# Patient Record
Sex: MALE | Race: WHITE | NOT HISPANIC OR LATINO | Employment: OTHER | URBAN - METROPOLITAN AREA
[De-identification: names, ages, dates, MRNs, and addresses within clinical notes are randomized per-mention and may not be internally consistent; named-entity substitution may affect disease eponyms.]

---

## 2014-04-30 LAB — CRC RECOMMENDATION EXT: NORMAL

## 2017-06-02 LAB — CRC RECOMMENDATION EXT: NORMAL

## 2017-06-29 ENCOUNTER — HISTORICAL (OUTPATIENT)
Dept: ADMINISTRATIVE | Facility: HOSPITAL | Age: 52
End: 2017-06-29

## 2017-06-29 LAB
ABS NEUT (OLG): 4.13 X10(3)/MCL (ref 2.1–9.2)
ALBUMIN SERPL-MCNC: 4.1 GM/DL (ref 3.4–5)
ALBUMIN/GLOB SERPL: 1.5 {RATIO}
ALP SERPL-CCNC: 47 UNIT/L (ref 50–136)
ALT SERPL-CCNC: 43 UNIT/L (ref 12–78)
APPEARANCE, UA: CLEAR
AST SERPL-CCNC: 25 UNIT/L (ref 15–37)
BACTERIA SPEC CULT: ABNORMAL /HPF
BASOPHILS # BLD AUTO: 0.1 X10(3)/MCL (ref 0–0.2)
BASOPHILS NFR BLD AUTO: 1 %
BILIRUB SERPL-MCNC: 0.5 MG/DL (ref 0.2–1)
BILIRUB UR QL STRIP: ABNORMAL
BILIRUBIN DIRECT+TOT PNL SERPL-MCNC: 0.1 MG/DL (ref 0–0.2)
BILIRUBIN DIRECT+TOT PNL SERPL-MCNC: 0.4 MG/DL (ref 0–0.8)
BUN SERPL-MCNC: 20 MG/DL (ref 7–18)
CALCIUM SERPL-MCNC: 8.8 MG/DL (ref 8.5–10.1)
CHLORIDE SERPL-SCNC: 102 MMOL/L (ref 98–107)
CHOLEST SERPL-MCNC: 91 MG/DL (ref 0–200)
CHOLEST/HDLC SERPL: 2.8 {RATIO} (ref 0–5)
CO2 SERPL-SCNC: 25 MMOL/L (ref 21–32)
COLOR UR: YELLOW
CREAT SERPL-MCNC: 1.23 MG/DL (ref 0.7–1.3)
EOSINOPHIL # BLD AUTO: 0.2 X10(3)/MCL (ref 0–0.9)
EOSINOPHIL NFR BLD AUTO: 3 %
ERYTHROCYTE [DISTWIDTH] IN BLOOD BY AUTOMATED COUNT: 13.4 % (ref 11.5–17)
EST. AVERAGE GLUCOSE BLD GHB EST-MCNC: 137 MG/DL
GLOBULIN SER-MCNC: 2.8 GM/DL (ref 2.4–3.5)
GLUCOSE (UA): NEGATIVE
GLUCOSE SERPL-MCNC: 133 MG/DL (ref 74–106)
HBA1C MFR BLD: 6.4 % (ref 4.2–6.3)
HCT VFR BLD AUTO: 49.2 % (ref 42–52)
HDLC SERPL-MCNC: 33 MG/DL (ref 35–60)
HGB BLD-MCNC: 16.3 GM/DL (ref 14–18)
HGB UR QL STRIP: NEGATIVE
KETONES UR QL STRIP: ABNORMAL
LDLC SERPL CALC-MCNC: 20 MG/DL (ref 0–129)
LEUKOCYTE ESTERASE UR QL STRIP: NEGATIVE
LYMPHOCYTES # BLD AUTO: 4.1 X10(3)/MCL (ref 0.6–4.6)
LYMPHOCYTES NFR BLD AUTO: 44 %
MCH RBC QN AUTO: 31.8 PG (ref 27–31)
MCHC RBC AUTO-ENTMCNC: 33.1 GM/DL (ref 33–36)
MCV RBC AUTO: 95.9 FL (ref 80–94)
MONOCYTES # BLD AUTO: 0.8 X10(3)/MCL (ref 0.1–1.3)
MONOCYTES NFR BLD AUTO: 8 %
NEUTROPHILS # BLD AUTO: 4.13 X10(3)/MCL (ref 1.4–7.9)
NEUTROPHILS NFR BLD AUTO: 44 %
NITRITE UR QL STRIP: NEGATIVE
PH UR STRIP: 6 [PH] (ref 5–9)
PLATELET # BLD AUTO: 173 X10(3)/MCL (ref 130–400)
PMV BLD AUTO: 11.8 FL (ref 9.4–12.4)
POTASSIUM SERPL-SCNC: 4.2 MMOL/L (ref 3.5–5.1)
PROT SERPL-MCNC: 6.9 GM/DL (ref 6.4–8.2)
PROT UR QL STRIP: NEGATIVE
PSA SERPL-MCNC: 0.57 NG/ML (ref 0–4)
RBC # BLD AUTO: 5.13 X10(6)/MCL (ref 4.7–6.1)
RBC #/AREA URNS HPF: ABNORMAL /[HPF]
SODIUM SERPL-SCNC: 138 MMOL/L (ref 136–145)
SP GR UR STRIP: 1.02 (ref 1–1.03)
SQUAMOUS EPITHELIAL, UA: ABNORMAL
TRIGL SERPL-MCNC: 189 MG/DL (ref 30–150)
TSH SERPL-ACNC: 3.24 MIU/ML (ref 0.36–3.74)
UROBILINOGEN UR STRIP-ACNC: 0.2
VLDLC SERPL CALC-MCNC: 38 MG/DL
WBC # SPEC AUTO: 9.3 X10(3)/MCL (ref 4.5–11.5)
WBC #/AREA URNS HPF: 40 /HPF (ref 0–3)

## 2017-07-01 LAB — FINAL CULTURE: NO GROWTH

## 2018-07-24 ENCOUNTER — HISTORICAL (OUTPATIENT)
Dept: ADMINISTRATIVE | Facility: HOSPITAL | Age: 53
End: 2018-07-24

## 2018-07-24 LAB
ABS NEUT (OLG): 4.27 X10(3)/MCL (ref 2.1–9.2)
ALBUMIN SERPL-MCNC: 4.2 GM/DL (ref 3.4–5)
ALP SERPL-CCNC: 34 UNIT/L (ref 50–136)
ALT SERPL-CCNC: 33 UNIT/L (ref 12–78)
AST SERPL-CCNC: 21 UNIT/L (ref 15–37)
BASOPHILS # BLD AUTO: 0.1 X10(3)/MCL (ref 0–0.2)
BASOPHILS NFR BLD AUTO: 1 %
BILIRUB SERPL-MCNC: 0.5 MG/DL (ref 0.2–1)
BILIRUBIN DIRECT+TOT PNL SERPL-MCNC: 0.1 MG/DL (ref 0–0.5)
BILIRUBIN DIRECT+TOT PNL SERPL-MCNC: 0.4 MG/DL (ref 0–0.8)
BUN SERPL-MCNC: 26 MG/DL (ref 7–18)
CREAT SERPL-MCNC: 1.21 MG/DL (ref 0.7–1.3)
EOSINOPHIL # BLD AUTO: 0.3 X10(3)/MCL (ref 0–0.9)
EOSINOPHIL NFR BLD AUTO: 3 %
ERYTHROCYTE [DISTWIDTH] IN BLOOD BY AUTOMATED COUNT: 13.8 % (ref 11.5–17)
HCT VFR BLD AUTO: 40.7 % (ref 42–52)
HGB BLD-MCNC: 13.3 GM/DL (ref 14–18)
LIVER PROFILE INTERP: ABNORMAL
LYMPHOCYTES # BLD AUTO: 3.5 X10(3)/MCL (ref 0.6–4.6)
LYMPHOCYTES NFR BLD AUTO: 39 %
MCH RBC QN AUTO: 31.5 PG (ref 27–31)
MCHC RBC AUTO-ENTMCNC: 32.7 GM/DL (ref 33–36)
MCV RBC AUTO: 96.4 FL (ref 80–94)
MONOCYTES # BLD AUTO: 0.8 X10(3)/MCL (ref 0.1–1.3)
MONOCYTES NFR BLD AUTO: 9 %
NEUTROPHILS # BLD AUTO: 4.27 X10(3)/MCL (ref 2.1–9.2)
NEUTROPHILS NFR BLD AUTO: 47 %
PLATELET # BLD AUTO: 190 X10(3)/MCL (ref 130–400)
PMV BLD AUTO: 11.1 FL (ref 9.4–12.4)
PROT SERPL-MCNC: 7.3 GM/DL (ref 6.4–8.2)
RBC # BLD AUTO: 4.22 X10(6)/MCL (ref 4.7–6.1)
VALPROATE SERPL-MCNC: 34 MCG/ML (ref 50–100)
WBC # SPEC AUTO: 9 X10(3)/MCL (ref 4.5–11.5)

## 2019-02-18 ENCOUNTER — HISTORICAL (OUTPATIENT)
Dept: ADMINISTRATIVE | Facility: HOSPITAL | Age: 54
End: 2019-02-18

## 2019-02-18 LAB
ABS NEUT (OLG): 5.52 X10(3)/MCL (ref 2.1–9.2)
ALBUMIN SERPL-MCNC: 4 GM/DL (ref 3.4–5)
ALBUMIN/GLOB SERPL: 1.3 {RATIO}
ALP SERPL-CCNC: 45 UNIT/L (ref 50–136)
ALT SERPL-CCNC: 47 UNIT/L (ref 12–78)
APPEARANCE, UA: CLEAR
AST SERPL-CCNC: 28 UNIT/L (ref 15–37)
BACTERIA SPEC CULT: NORMAL /HPF
BASOPHILS # BLD AUTO: 0.1 X10(3)/MCL (ref 0–0.2)
BASOPHILS NFR BLD AUTO: 1 %
BILIRUB SERPL-MCNC: 0.6 MG/DL (ref 0.2–1)
BILIRUB UR QL STRIP: NEGATIVE
BILIRUBIN DIRECT+TOT PNL SERPL-MCNC: 0.1 MG/DL (ref 0–0.2)
BILIRUBIN DIRECT+TOT PNL SERPL-MCNC: 0.5 MG/DL (ref 0–0.8)
BUN SERPL-MCNC: 20 MG/DL (ref 7–18)
CALCIUM SERPL-MCNC: 9 MG/DL (ref 8.5–10.1)
CHLORIDE SERPL-SCNC: 110 MMOL/L (ref 98–107)
CHOLEST SERPL-MCNC: 126 MG/DL (ref 0–200)
CHOLEST/HDLC SERPL: 3.9 {RATIO} (ref 0–5)
CO2 SERPL-SCNC: 24 MMOL/L (ref 21–32)
COLOR UR: YELLOW
CREAT SERPL-MCNC: 1.18 MG/DL (ref 0.7–1.3)
EOSINOPHIL # BLD AUTO: 0.3 X10(3)/MCL (ref 0–0.9)
EOSINOPHIL NFR BLD AUTO: 3 %
ERYTHROCYTE [DISTWIDTH] IN BLOOD BY AUTOMATED COUNT: 13.7 % (ref 11.5–17)
EST. AVERAGE GLUCOSE BLD GHB EST-MCNC: 163 MG/DL
GLOBULIN SER-MCNC: 3.1 GM/DL (ref 2.4–3.5)
GLUCOSE (UA): NEGATIVE
GLUCOSE SERPL-MCNC: 152 MG/DL (ref 74–106)
HBA1C MFR BLD: 7.3 % (ref 4.2–6.3)
HCT VFR BLD AUTO: 47.8 % (ref 42–52)
HDLC SERPL-MCNC: 32 MG/DL (ref 35–60)
HGB BLD-MCNC: 15.2 GM/DL (ref 14–18)
HGB UR QL STRIP: NEGATIVE
KETONES UR QL STRIP: NEGATIVE
LDLC SERPL CALC-MCNC: 39 MG/DL (ref 0–129)
LEUKOCYTE ESTERASE UR QL STRIP: NEGATIVE
LYMPHOCYTES # BLD AUTO: 3 X10(3)/MCL (ref 0.6–4.6)
LYMPHOCYTES NFR BLD AUTO: 31 %
MCH RBC QN AUTO: 31.1 PG (ref 27–31)
MCHC RBC AUTO-ENTMCNC: 31.8 GM/DL (ref 33–36)
MCV RBC AUTO: 98 FL (ref 80–94)
MONOCYTES # BLD AUTO: 0.8 X10(3)/MCL (ref 0.1–1.3)
MONOCYTES NFR BLD AUTO: 8 %
NEUTROPHILS # BLD AUTO: 5.52 X10(3)/MCL (ref 2.1–9.2)
NEUTROPHILS NFR BLD AUTO: 57 %
NITRITE UR QL STRIP: NEGATIVE
PH UR STRIP: 5 [PH] (ref 5–9)
PLATELET # BLD AUTO: 168 X10(3)/MCL (ref 130–400)
PMV BLD AUTO: 11.5 FL (ref 9.4–12.4)
POTASSIUM SERPL-SCNC: 4.6 MMOL/L (ref 3.5–5.1)
PROT SERPL-MCNC: 7.1 GM/DL (ref 6.4–8.2)
PROT UR QL STRIP: NEGATIVE
PSA SERPL-MCNC: 0.58 NG/ML (ref 0–4)
RBC # BLD AUTO: 4.88 X10(6)/MCL (ref 4.7–6.1)
RBC #/AREA URNS HPF: NORMAL /[HPF]
SODIUM SERPL-SCNC: 142 MMOL/L (ref 136–145)
SP GR UR STRIP: 1.02 (ref 1–1.03)
SQUAMOUS EPITHELIAL, UA: NORMAL
TRIGL SERPL-MCNC: 276 MG/DL (ref 30–150)
TSH SERPL-ACNC: 2.4 MIU/L (ref 0.36–3.74)
UROBILINOGEN UR STRIP-ACNC: 0.2
VLDLC SERPL CALC-MCNC: 55 MG/DL
WBC # SPEC AUTO: 9.7 X10(3)/MCL (ref 4.5–11.5)
WBC #/AREA URNS HPF: NORMAL /HPF

## 2019-05-31 ENCOUNTER — HISTORICAL (OUTPATIENT)
Dept: ADMINISTRATIVE | Facility: HOSPITAL | Age: 54
End: 2019-05-31

## 2019-05-31 LAB
ABS NEUT (OLG): 4.88 X10(3)/MCL (ref 2.1–9.2)
ALBUMIN SERPL-MCNC: 4.1 GM/DL (ref 3.4–5)
ALBUMIN/GLOB SERPL: 1.2 RATIO (ref 1.1–2)
ALP SERPL-CCNC: 50 UNIT/L (ref 50–136)
ALT SERPL-CCNC: 59 UNIT/L (ref 12–78)
APPEARANCE, UA: CLEAR
AST SERPL-CCNC: 44 UNIT/L (ref 15–37)
BACTERIA SPEC CULT: ABNORMAL /HPF
BASOPHILS # BLD AUTO: 0.1 X10(3)/MCL (ref 0–0.2)
BASOPHILS NFR BLD AUTO: 1 %
BILIRUB SERPL-MCNC: 0.7 MG/DL (ref 0.2–1)
BILIRUB UR QL STRIP: ABNORMAL
BILIRUBIN DIRECT+TOT PNL SERPL-MCNC: 0.2 MG/DL (ref 0–0.5)
BILIRUBIN DIRECT+TOT PNL SERPL-MCNC: 0.5 MG/DL (ref 0–0.8)
BUN SERPL-MCNC: 17 MG/DL (ref 7–18)
BUN SERPL-MCNC: 19 MG/DL (ref 7–18)
CALCIUM SERPL-MCNC: 10 MG/DL (ref 8.5–10.1)
CHLORIDE SERPL-SCNC: 108 MMOL/L (ref 98–107)
CHOLEST SERPL-MCNC: 123 MG/DL (ref 0–200)
CHOLEST/HDLC SERPL: 2.7 {RATIO} (ref 0–5)
CO2 SERPL-SCNC: 28 MMOL/L (ref 21–32)
COLOR UR: ABNORMAL
CREAT SERPL-MCNC: 1.04 MG/DL (ref 0.7–1.3)
CREAT SERPL-MCNC: 1.06 MG/DL (ref 0.7–1.3)
CREAT UR-MCNC: 299 MG/DL
EOSINOPHIL # BLD AUTO: 0.2 X10(3)/MCL (ref 0–0.9)
EOSINOPHIL NFR BLD AUTO: 2 %
ERYTHROCYTE [DISTWIDTH] IN BLOOD BY AUTOMATED COUNT: 14.6 % (ref 11.5–17)
EST. AVERAGE GLUCOSE BLD GHB EST-MCNC: 117 MG/DL
GLOBULIN SER-MCNC: 3.4 GM/DL (ref 2.4–3.5)
GLUCOSE (UA): NEGATIVE
GLUCOSE SERPL-MCNC: 98 MG/DL (ref 74–106)
HBA1C MFR BLD: 5.7 % (ref 4.2–6.3)
HCT VFR BLD AUTO: 48.1 % (ref 42–52)
HDLC SERPL-MCNC: 46 MG/DL (ref 35–60)
HGB BLD-MCNC: 15.7 GM/DL (ref 14–18)
HGB UR QL STRIP: NEGATIVE
KETONES UR QL STRIP: ABNORMAL
LDLC SERPL CALC-MCNC: 54 MG/DL (ref 0–129)
LEUKOCYTE ESTERASE UR QL STRIP: NEGATIVE
LYMPHOCYTES # BLD AUTO: 2.9 X10(3)/MCL (ref 0.6–4.6)
LYMPHOCYTES NFR BLD AUTO: 33 %
MCH RBC QN AUTO: 31 PG (ref 27–31)
MCHC RBC AUTO-ENTMCNC: 32.6 GM/DL (ref 33–36)
MCV RBC AUTO: 95.1 FL (ref 80–94)
MICROALBUMIN UR-MCNC: 2.1 MG/DL
MICROALBUMIN/CREAT RATIO PNL UR: 7 MG/GM CR (ref 0–30)
MONOCYTES # BLD AUTO: 0.8 X10(3)/MCL (ref 0.1–1.3)
MONOCYTES NFR BLD AUTO: 10 %
MUCOUS THREADS URNS QL MICRO: SLIGHT
NEUTROPHILS # BLD AUTO: 4.88 X10(3)/MCL (ref 2.1–9.2)
NEUTROPHILS NFR BLD AUTO: 55 %
NITRITE UR QL STRIP: NEGATIVE
PH UR STRIP: 5.5 [PH] (ref 5–9)
PLATELET # BLD AUTO: 196 X10(3)/MCL (ref 130–400)
PMV BLD AUTO: 12.2 FL (ref 9.4–12.4)
POTASSIUM SERPL-SCNC: 4.7 MMOL/L (ref 3.5–5.1)
PROT SERPL-MCNC: 7.5 GM/DL (ref 6.4–8.2)
PROT UR QL STRIP: NEGATIVE
RBC # BLD AUTO: 5.06 X10(6)/MCL (ref 4.7–6.1)
RBC #/AREA URNS HPF: ABNORMAL /[HPF]
SODIUM SERPL-SCNC: 141 MMOL/L (ref 136–145)
SP GR UR STRIP: 1.03 (ref 1–1.03)
SQUAMOUS EPITHELIAL, UA: ABNORMAL
TRIGL SERPL-MCNC: 115 MG/DL (ref 30–150)
UROBILINOGEN UR STRIP-ACNC: 1
VALPROATE SERPL-MCNC: 51 MCG/ML (ref 50–100)
VLDLC SERPL CALC-MCNC: 23 MG/DL
WBC # SPEC AUTO: 8.9 X10(3)/MCL (ref 4.5–11.5)
WBC #/AREA URNS HPF: ABNORMAL /HPF

## 2021-08-06 ENCOUNTER — HISTORICAL (OUTPATIENT)
Dept: ADMINISTRATIVE | Facility: HOSPITAL | Age: 56
End: 2021-08-06

## 2021-08-06 LAB — SARS-COV-2 RNA RESP QL NAA+PROBE: NOT DETECTED

## 2021-10-11 ENCOUNTER — HISTORICAL (OUTPATIENT)
Dept: ADMINISTRATIVE | Facility: HOSPITAL | Age: 56
End: 2021-10-11

## 2021-10-25 ENCOUNTER — HISTORICAL (OUTPATIENT)
Dept: ADMINISTRATIVE | Facility: HOSPITAL | Age: 56
End: 2021-10-25

## 2022-04-10 ENCOUNTER — HISTORICAL (OUTPATIENT)
Dept: ADMINISTRATIVE | Facility: HOSPITAL | Age: 57
End: 2022-04-10

## 2022-04-24 VITALS
HEIGHT: 68 IN | BODY MASS INDEX: 31.52 KG/M2 | WEIGHT: 208 LBS | DIASTOLIC BLOOD PRESSURE: 76 MMHG | SYSTOLIC BLOOD PRESSURE: 130 MMHG | OXYGEN SATURATION: 98 %

## 2022-05-03 NOTE — HISTORICAL [facility]
This is a historical note converted from [place]. Formatting and pictures may have been removed.  Please reference [employer]ida for original formatting and attached multimedia. Chief Complaint  Here for R elbow pain, no prior injury or sx, 3 weeks ago pt woke up with his arm stiff and painful, has lump on side of elbow, c/o not being able to do certain ROM, not taking anything for pain, xr today  History of Present Illness  He is a pleasant 54yo who presents with right elbow pain since September 2021. He denies injury but he does recall in the week before chopping wood. He states he woke up one morning with right elbow pain and his elbow stiff at 90degrees flexion. He had to stretch his elbow to straighten it which was very painful.??Hes had continued pain along the lateral elbow that extends to the palmar aspect of the wrist. Pain is worse with palpation and movement. Hes had difficulty with turning doorknobs and opening things. It is somewhat better with rest. He has not taken much for pain.  Review of Systems  Comprehensive review of system was performed with no exceptions other than noted in the history of present illness  Physical Exam  Vitals & Measurements  T:?36.5? ?C (Oral)? HR:?83(Peripheral)? BP:?118/83?  HT:?172.00?cm? WT:?90.264?kg? BMI:?30.51?  Gen: WN, WD, NAD  Card/Res: NL breathing, +distal pulses  Abdomen: ND  Musculoskeletal: Right elbow without obvious deformity. Without erythema, edema. or bruising. TTP laterally along lateral epicondyle and tenderness extends to the wrist. Extension to 30 degrees. Flexion to 100 degrees. Supination and pronation to 90 degrees. Difficulty with making a full fist. Negative Finkelstein.  Assessment/Plan  1.?Lateral epicondylitis?M77.10  ?Suspected lateral epicondylitis. We will try an injection today. He was encouraged to call back if pain persists to consider an MRI.  ?  Risks of cortisone were discussed with the patient including hypopigmentation subcutaneous fat  atrophy, and post injection pain flare. The patient understood these risks and requested to proceed. The right?elbow was prepped with betadine. The 1cc of steroid and 3cc of lidocaine injection was administered under sterile techinique. The injection was administered in clinic by Trudy Mckeon, and the patient tolerated the procedure well.?  Ordered:  betamethasone, 6 mg, Intra-Articular, Once, first dose 10/11/21 11:00:00 CDT, stop date 10/11/21 11:00:00 CDT  Lidocaine inj., 1 mL, Intra-Articular, Once, first dose 10/11/21 10:22:00 CDT, stop date 10/11/21 10:22:00 CDT  asp/inj intermed joint/bursa [id number] , 10/11/21 10:22:00 CDT, [facility], Routine, 10/11/21 10:22:00 CDT, Lateral epicondylitis  Clinic Follow-up PRN, 10/11/21 10:22:00 CDT, Future Order, [facility]  Office/Outpatient Visit Level 3 [facility] [id number] , Lateral epicondylitis, [facility], 10/11/21 10:22:00 CDT  ?  Referrals  Clinic Follow-up PRN, 10/11/21 10:22:00 CDT, Future Order, [facility]   Problem List/Past Medical History  Ongoing  Asthma  Back pain  Bipolar  Cough  Diabetes  History of colon polyps  IFG (impaired fasting glucose)  Obesity  Prostate cancer screening  Schizophrenia  Sleep apnea  Historical  Bipolar  Hyperlipemia  Hypoglycemia  Schizophrenia  Tobacco use  Procedure/Surgical History  Rotator cuff repair (2016)  Sinus (2009)  Tonsillectomy (2009)  Tubes (2009)  Uvula operation (2009)  Cholecystectomy (2008)   Medications  albuterol 0.083% inhalation solution, 2.5 mg= 3 mL, INH, q6hr, PRN, 3 refills  ALPRAZolam 2 mg oral tablet, 2 mg= 1 tab(s), Oral, TID  aripiprazole 10 mg oral tablet, 10 mg= 1 tab(s), Oral, Daily  Ativan, 1 mg= 0.5 mL, IV Push, Once  ATORVASTATIN 40 MG TABLET, 40 mg= 1 tab(s), Oral, Daily  benztropine 2 mg oral tablet  Chantix Starter Pack 0.5 mg-1 mg oral tablet, 1 tab(s), Oral, BID,? ?Not Taking, Completed Rx  divalproex sodium 500 mg oral tablet, extended release, 500 mg= 1 tab(s), Oral,  Daily  FENOFIBRATE 145 MG TABLET  Flomax 0.4 mg oral capsule, 0.4 mg= 1 cap(s), Oral, Daily  furosemide 20 mg oral tablet, See Instructions, 12 refills  Linzess 145 mcg oral capsule, 145 mcg= 1 cap(s), Oral, Daily, 11 refills  metformin 500 mg oral tablet, See Instructions  Seroquel  mg oral tablet, extended release, 800 mg= 2 tab(s), Oral, qPM  traZODONE 50 mg oral tablet ( Desyrel ), 50 mg= 1 tab(s), Oral, qPM  Ventolin HFA 90 mcg/inh inhalation aerosol, 1 puff(s), INH, q6hr, PRN, 3 refills  Allergies  nitroglycerin  Social History  Abuse/Neglect  No, 10/11/2021  Alcohol - Denies Alcohol Use, 07/20/2015  Never, 06/08/2015  Employment/School  Unemployed, 06/08/2015  Exercise - Does not exercise, 06/08/2015  Exercise frequency: 1-2 times/week., 07/08/2016  Home/Environment  Lives with Significant other., 06/08/2015  Nutrition/Health  Regular, 06/08/2015  Sexual  Sexually active: Yes., 07/08/2016  Substance Use - Denies Substance Abuse, 07/20/2015  Never, 06/08/2015  Tobacco - High Risk, 07/20/2015  5-9 cigarettes (between 1/4 to 1/2 pack)/day in last 30 days, N/A, 10/11/2021  Family History  Dementia: Grandfather.  Depression.: Mother, Father, Sister, Brother, Grandfather, Grandfather and Grandmother.  Diabetes mellitus type 2: Grandmother.  Heart disease: Mother and Father.  Hyperlipidemia.: Mother and Father.  Tobacco: Mother, Sister and Brother.  Immunizations  Vaccine Date Status   COVID-19 MRNA, LNP-S, PF- Pfizer 03/29/2021 Given   COVID-19 MRNA, LNP-S, PF- Pfizer 03/08/2021 Given   influenza virus vaccine, inactivated 12/18/2019 Recorded   zoster vaccine live 07/20/2019 Recorded   zoster vaccine live 04/26/2019 Recorded   pneumococcal 23-valent vaccine 04/26/2019 Recorded   tetanus/diphtheria/pertussis, acel(Tdap) 04/26/2019 Recorded   influenza virus vaccine, inactivated 11/28/2018 Recorded   influenza virus vaccine, inactivated 09/27/2017 Recorded   influenza virus vaccine, inactivated 01/12/2017  Recorded   influenza virus vaccine, inactivated 01/09/2017 Recorded   influenza virus vaccine, inactivated 10/15/2015 Recorded   influenza virus vaccine, inactivated 10/09/2014 Recorded   Health Maintenance  Health Maintenance  ???Pending?(in the next year)  ??? ??OverDue  ??? ? ? ?Smoking Cessation due??01/01/21??and every 1??year(s)  ??? ? ? ?Medicare Annual Wellness Exam due??08/28/21??and every 1??year(s)  ??? ??Due?  ??? ? ? ?Aspirin Therapy for CVD Prevention due??08/28/21??and every 1??year(s)  ??? ? ? ?Diabetes Maintenance-Microalbumin due??10/11/21??Unknown Frequency  ??? ? ? ?Lung Cancer Screening due??10/11/21??and every 1??year(s)  ??? ? ? ?Zoster Vaccine due??10/11/21??Unknown Frequency  ??? ??Due In Future?  ??? ? ? ?Obesity Screening not due until??01/01/22??and every 1??year(s)  ??? ? ? ?Alcohol Misuse Screening not due until??01/02/22??and every 1??year(s)  ??? ? ? ?Diabetes Maintenance-Eye Exam not due until??01/17/22??and every 2??year(s)  ??? ? ? ?Diabetes Maintenance-HgbA1c not due until??05/10/22??and every 1??year(s)  ??? ? ? ?Diabetes Maintenance-Serum Creatinine not due until??05/10/22??and every 1??year(s)  ??? ? ? ?Diabetes Maintenance-Fasting Lipid Profile not due until??05/10/22??and every 1??year(s)  ??? ? ? ?Blood Pressure Screening not due until??05/17/22??and every 1??year(s)  ??? ? ? ?Body Mass Index Check not due until??05/17/22??and every 1??year(s)  ??? ? ? ?Diabetes Maintenance-Foot Exam not due until??05/17/22??and every 1??year(s)  ???Satisfied?(in the past 1 year)  ??? ??Satisfied?  ??? ? ? ?ADL Screening on??10/11/21.??Satisfied by Oly Montanez  ??? ? ? ?Alcohol Misuse Screening on??05/17/21.??Satisfied by Chelsie Ramos LPN.  ??? ? ? ?Blood Pressure Screening on??10/11/21.??Satisfied by Oly Montanez  ??? ? ? ?Body Mass Index Check on??10/11/21.??Satisfied by Oly Montanez  ??? ? ? ?Depression Screening on??10/11/21.??Satisfied by  Oly Montanez  ??? ? ? ?Diabetes Maintenance-Foot Exam on??05/17/21.??Satisfied by Hamilton Tyler II, MD  ??? ? ? ?Diabetes Screening on??05/10/21.??Satisfied by Chelsie Ramos LPN.  ??? ? ? ?Influenza Vaccine on??10/11/21.??Satisfied by Oly Montanez  ??? ? ? ?Lipid Screening on??05/10/21.??Satisfied by Chelsie Ramos LPN  ??? ? ? ?Obesity Screening on??10/11/21.??Satisfied by Oly Montanez  ?      Diagnosis: right lateral epicondylitis

## 2022-05-27 ENCOUNTER — OFFICE VISIT (OUTPATIENT)
Dept: INTERNAL MEDICINE | Facility: CLINIC | Age: 57
End: 2022-05-27
Payer: MEDICARE

## 2022-05-27 VITALS
DIASTOLIC BLOOD PRESSURE: 68 MMHG | BODY MASS INDEX: 30.61 KG/M2 | TEMPERATURE: 98 F | OXYGEN SATURATION: 98 % | WEIGHT: 195 LBS | SYSTOLIC BLOOD PRESSURE: 110 MMHG | HEART RATE: 64 BPM | HEIGHT: 67 IN | RESPIRATION RATE: 14 BRPM

## 2022-05-27 DIAGNOSIS — G47.33 OBSTRUCTIVE SLEEP APNEA SYNDROME: Primary | ICD-10-CM

## 2022-05-27 DIAGNOSIS — E11.9 TYPE 2 DIABETES MELLITUS WITHOUT COMPLICATION, WITHOUT LONG-TERM CURRENT USE OF INSULIN: ICD-10-CM

## 2022-05-27 DIAGNOSIS — F20.9 SCHIZOPHRENIA, UNSPECIFIED TYPE: ICD-10-CM

## 2022-05-27 DIAGNOSIS — E78.49 OTHER HYPERLIPIDEMIA: ICD-10-CM

## 2022-05-27 DIAGNOSIS — F31.9 BIPOLAR AFFECTIVE DISORDER, REMISSION STATUS UNSPECIFIED: ICD-10-CM

## 2022-05-27 PROBLEM — Z86.0100 HISTORY OF COLONIC POLYPS: Status: ACTIVE | Noted: 2022-05-27

## 2022-05-27 PROBLEM — Z86.010 HISTORY OF COLONIC POLYPS: Status: ACTIVE | Noted: 2022-05-27

## 2022-05-27 PROBLEM — G47.30 SLEEP APNEA: Status: ACTIVE | Noted: 2022-05-27

## 2022-05-27 PROCEDURE — 1159F MED LIST DOCD IN RCRD: CPT | Mod: CPTII,,, | Performed by: INTERNAL MEDICINE

## 2022-05-27 PROCEDURE — 3074F SYST BP LT 130 MM HG: CPT | Mod: CPTII,,, | Performed by: INTERNAL MEDICINE

## 2022-05-27 PROCEDURE — 3078F DIAST BP <80 MM HG: CPT | Mod: CPTII,,, | Performed by: INTERNAL MEDICINE

## 2022-05-27 PROCEDURE — 99214 OFFICE O/P EST MOD 30 MIN: CPT | Mod: ,,, | Performed by: INTERNAL MEDICINE

## 2022-05-27 PROCEDURE — 99214 PR OFFICE/OUTPT VISIT, EST, LEVL IV, 30-39 MIN: ICD-10-PCS | Mod: ,,, | Performed by: INTERNAL MEDICINE

## 2022-05-27 PROCEDURE — 3078F PR MOST RECENT DIASTOLIC BLOOD PRESSURE < 80 MM HG: ICD-10-PCS | Mod: CPTII,,, | Performed by: INTERNAL MEDICINE

## 2022-05-27 PROCEDURE — 3074F PR MOST RECENT SYSTOLIC BLOOD PRESSURE < 130 MM HG: ICD-10-PCS | Mod: CPTII,,, | Performed by: INTERNAL MEDICINE

## 2022-05-27 PROCEDURE — 1160F PR REVIEW ALL MEDS BY PRESCRIBER/CLIN PHARMACIST DOCUMENTED: ICD-10-PCS | Mod: CPTII,,, | Performed by: INTERNAL MEDICINE

## 2022-05-27 PROCEDURE — 1159F PR MEDICATION LIST DOCUMENTED IN MEDICAL RECORD: ICD-10-PCS | Mod: CPTII,,, | Performed by: INTERNAL MEDICINE

## 2022-05-27 PROCEDURE — 3008F PR BODY MASS INDEX (BMI) DOCUMENTED: ICD-10-PCS | Mod: CPTII,,, | Performed by: INTERNAL MEDICINE

## 2022-05-27 PROCEDURE — 1160F RVW MEDS BY RX/DR IN RCRD: CPT | Mod: CPTII,,, | Performed by: INTERNAL MEDICINE

## 2022-05-27 PROCEDURE — 3008F BODY MASS INDEX DOCD: CPT | Mod: CPTII,,, | Performed by: INTERNAL MEDICINE

## 2022-05-27 RX ORDER — BENZTROPINE MESYLATE 2 MG/1
2 TABLET ORAL 3 TIMES DAILY
COMMUNITY

## 2022-05-27 RX ORDER — FLUNISOLIDE 0.25 MG/ML
2 SOLUTION NASAL 2 TIMES DAILY
Qty: 1 EACH | Refills: 11 | Status: SHIPPED | OUTPATIENT
Start: 2022-05-27

## 2022-05-27 RX ORDER — ALPRAZOLAM 1 MG/1
1 TABLET ORAL 3 TIMES DAILY PRN
COMMUNITY
End: 2023-04-04

## 2022-05-27 RX ORDER — OXCARBAZEPINE 150 MG/1
150 TABLET, FILM COATED ORAL 2 TIMES DAILY
COMMUNITY
Start: 2021-10-25

## 2022-05-27 RX ORDER — ATORVASTATIN CALCIUM 40 MG/1
40 TABLET, FILM COATED ORAL NIGHTLY
COMMUNITY

## 2022-05-27 RX ORDER — ARIPIPRAZOLE 10 MG/1
10 TABLET ORAL DAILY
COMMUNITY
Start: 2021-05-17 | End: 2023-05-15

## 2022-05-27 RX ORDER — TRAZODONE HYDROCHLORIDE 50 MG/1
50 TABLET ORAL NIGHTLY
COMMUNITY
Start: 2021-05-17 | End: 2023-05-15

## 2022-05-27 RX ORDER — FLUNISOLIDE 0.25 MG/ML
2 SOLUTION NASAL 2 TIMES DAILY
COMMUNITY
End: 2022-05-27 | Stop reason: SDUPTHER

## 2022-05-27 RX ORDER — PANTOPRAZOLE SODIUM 40 MG/1
40 TABLET, DELAYED RELEASE ORAL DAILY
COMMUNITY
Start: 2021-10-25 | End: 2023-11-14 | Stop reason: SDUPTHER

## 2022-05-27 RX ORDER — DIVALPROEX SODIUM 500 MG/1
1500 TABLET, DELAYED RELEASE ORAL
COMMUNITY
End: 2023-01-06

## 2022-05-27 RX ORDER — QUETIAPINE FUMARATE 400 MG/1
800 TABLET, FILM COATED ORAL
COMMUNITY
End: 2023-05-15

## 2022-05-27 RX ORDER — FENOFIBRATE 145 MG/1
145 TABLET, FILM COATED ORAL
COMMUNITY
End: 2022-05-27

## 2022-05-27 RX ORDER — METFORMIN HYDROCHLORIDE 500 MG/1
1 TABLET ORAL DAILY
COMMUNITY
Start: 2021-12-29 | End: 2023-01-06

## 2022-05-27 NOTE — PROGRESS NOTES
"Subjective:       Patient ID: Sharan Cadena is a 56 y.o. male.    Chief Complaint: Depression    56-year-old male seen today for followup of schizophrenia, bipolar disorder, chronic neck and back pain, anxiety, sleep apnea, tobacco use, chronic rhinosinusitis, and peptic ulcer disease.    Review of Systems   Constitutional: Negative for fever.   HENT: Negative for nosebleeds.    Eyes: Negative for visual disturbance.   Respiratory: Negative for shortness of breath.    Cardiovascular: Negative for chest pain.   Gastrointestinal: Negative for abdominal pain.   Genitourinary: Negative for dysuria.   Musculoskeletal: Negative for gait problem.   Neurological: Negative for headaches.         Objective:      Physical Exam  HENT:      Head: Normocephalic.      Mouth/Throat:      Pharynx: Oropharynx is clear.   Eyes:      Extraocular Movements: Extraocular movements intact.   Cardiovascular:      Rate and Rhythm: Normal rate and regular rhythm.   Pulmonary:      Breath sounds: Normal breath sounds.   Abdominal:      Palpations: Abdomen is soft.   Musculoskeletal:         General: No swelling.   Skin:     General: Skin is warm.   Neurological:      General: No focal deficit present.      Mental Status: He is alert and oriented to person, place, and time.   Psychiatric:         Mood and Affect: Mood normal.         Vitals:    05/27/22 0959   BP: 110/68   Pulse: 64   Resp: 14   Temp: 97.6 °F (36.4 °C)   SpO2: 98%   Weight: 88.5 kg (195 lb)   Height: 5' 7" (1.702 m)      Assessment:       Problem List Items Addressed This Visit        Psychiatric    Bipolar disorder    Schizophrenia       Cardiac/Vascular    Other hyperlipidemia       Endocrine    Diabetes mellitus    Relevant Medications    metFORMIN (GLUCOPHAGE) 500 MG tablet       Other    Sleep apnea - Primary          Medication List with Changes/Refills   Current Medications    ALPRAZOLAM (XANAX) 1 MG TABLET    Take 1 mg by mouth 3 (three) times daily as needed.    " ARIPIPRAZOLE (ABILIFY) 10 MG TAB    Take 10 mg by mouth once daily.    ATORVASTATIN (LIPITOR) 40 MG TABLET    Take 40 mg by mouth.    BENZTROPINE (COGENTIN) 2 MG TAB    Take 2 mg by mouth.    DIVALPROEX (DEPAKOTE) 500 MG TBEC    Take 1,500 mg by mouth.    FENOFIBRATE (TRICOR) 145 MG TABLET    Take 145 mg by mouth.    FLUNISOLIDE 25 MCG, 0.025%, (NASALIDE) 25 MCG (0.025 %) SPRY    2 sprays by Nasal route 2 (two) times daily.    LINACLOTIDE (LINZESS) 290 MCG CAP CAPSULE    Take 290 mcg by mouth once daily.    METFORMIN (GLUCOPHAGE) 500 MG TABLET    Take 1 tablet by mouth once daily.    OXCARBAZEPINE (TRILEPTAL) 150 MG TAB    Take 150 mg by mouth 2 (two) times a day.    PANTOPRAZOLE (PROTONIX) 40 MG TABLET    Take 40 mg by mouth once daily.    QUETIAPINE (SEROQUEL) 400 MG TABLET    Take 800 mg by mouth.    TRAZODONE (DESYREL) 50 MG TABLET    Take 50 mg by mouth every evening.        Plan:       1. Schizophrenia: He is seen by Dr. Thorne and his nurse practitioner regularly.     2. Chronic neck and back pain: Stable. He underwent sacroiliac joint fusion with Dr. Aldo Conway in 2019 with good results.    3. Sleep apnea: Continue CPAP.     4. Tobacco use: He was smoking 1 pack per day and is down to a 1/4 PPD, trying to quit.     5. Peptic ulcer disease: Continue omeprazole. He had an EGD about 6 years ago.    6. Chronic rhinosinusitis: He also has asthma. Because of ongoing cough, referred back to ENT at last visit and was given medication for allergies; Flonase was started    7. Hyperlipidemia: He is followed by cardiology. Continue medication. LDL at goal. Stop fenofibrate (he lost over 50 pounds in past year)    8. Diabetes: A1c 5.4. Continue metformin once per day    9. Edema: Lasix as needed    10. Asthma: Stable. Pneumovax 2019    11.  Chronic constipation: He has tried over-the-counter laxatives. SBO in 10/2021. Linzess working well    12. Wellness: Colonoscopy in 6/2017 with polyps, every 5 years. 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 Only plays at home now

## 2022-06-21 ENCOUNTER — PATIENT OUTREACH (OUTPATIENT)
Dept: ADMINISTRATIVE | Facility: HOSPITAL | Age: 57
End: 2022-06-21
Payer: MEDICARE

## 2022-06-21 NOTE — PROGRESS NOTES
Population Health Outreach.Records Received, hyper-linked into chart at this time. The following record(s)  below were uploaded for Health Maintenance .       4/30/14 COLONOSCOPY

## 2022-06-21 NOTE — PROGRESS NOTES
Population Health Outreach.Records Received, hyper-linked into chart at this time. The following record(s)  below were uploaded for Health Maintenance .               6/2/17 COLONOSCOPY

## 2022-06-22 LAB — CRC RECOMMENDATION EXT: NORMAL

## 2022-07-25 ENCOUNTER — TELEPHONE (OUTPATIENT)
Dept: INTERNAL MEDICINE | Facility: CLINIC | Age: 57
End: 2022-07-25
Payer: MEDICARE

## 2022-08-16 ENCOUNTER — TELEPHONE (OUTPATIENT)
Dept: INTERNAL MEDICINE | Facility: CLINIC | Age: 57
End: 2022-08-16
Payer: MEDICARE

## 2022-08-16 NOTE — TELEPHONE ENCOUNTER
"----- Message from Hamilton Tyler II, MD sent at 8/16/2022  2:14 PM CDT -----  Regarding: RE: call  Ideal weight is around 160 pounds since he is 5'7"  ----- Message -----  From: Chelsie Ramos LPN  Sent: 8/16/2022   1:40 PM CDT  To: Hamilton Tyler II, MD  Subject: FW: call                                           ----- Message -----  From: Sapna Warren  Sent: 8/16/2022   1:36 PM CDT  To: Chelsie Ramos LPN  Subject: call                                             Pt is req a call back.  He was weighing 254 down to 175.  He is asking what should his ideal weight be?  Please call  [phone]        "

## 2022-09-06 RX ORDER — LINACLOTIDE 290 UG/1
CAPSULE, GELATIN COATED ORAL
Qty: 30 CAPSULE | Refills: 0 | Status: SHIPPED | OUTPATIENT
Start: 2022-09-06 | End: 2022-10-06

## 2022-09-07 NOTE — TELEPHONE ENCOUNTER
Spoke with patient at this time explained we could not add any more meds being that he is on specific meds for bipolar. Verbalized understanding.

## 2022-09-07 NOTE — TELEPHONE ENCOUNTER
Having Trouble sleeping. Gets psych meds from psychiatrist and was told he couldn't give anything for sleeping. Takes trazodone for bipolar disorder, and he didn't want to go up on dosage.

## 2022-09-26 ENCOUNTER — TELEPHONE (OUTPATIENT)
Dept: INTERNAL MEDICINE | Facility: CLINIC | Age: 57
End: 2022-09-26
Payer: MEDICARE

## 2022-09-26 DIAGNOSIS — J45.909 ASTHMA, UNSPECIFIED ASTHMA SEVERITY, UNSPECIFIED WHETHER COMPLICATED, UNSPECIFIED WHETHER PERSISTENT: Primary | ICD-10-CM

## 2022-09-26 NOTE — TELEPHONE ENCOUNTER
----- Message from Vashti Murray sent at 9/26/2022  3:08 PM CDT -----  Raz refills on Qvar 80mcg asthma inhaler  Uses [facility] on [address]

## 2022-09-29 ENCOUNTER — TELEPHONE (OUTPATIENT)
Dept: INTERNAL MEDICINE | Facility: CLINIC | Age: 57
End: 2022-09-29
Payer: MEDICARE

## 2022-09-29 DIAGNOSIS — J45.909 ASTHMA, UNSPECIFIED ASTHMA SEVERITY, UNSPECIFIED WHETHER COMPLICATED, UNSPECIFIED WHETHER PERSISTENT: Primary | ICD-10-CM

## 2022-09-29 RX ORDER — FLUTICASONE FUROATE 100 UG/1
1 POWDER RESPIRATORY (INHALATION) DAILY
Qty: 30 EACH | Refills: 2 | Status: SHIPPED | OUTPATIENT
Start: 2022-09-29 | End: 2022-10-29

## 2022-09-29 NOTE — TELEPHONE ENCOUNTER
----- Message from Geri Montalvo Patient Care Assistant sent at 9/29/2022  9:46 AM CDT -----  Regarding: FW: med refill  Pt just called back again to say the pharmacy needs a PA sent along with the order please.  ----- Message -----  From: Geri Montalvo Patient Care Assistant  Sent: 9/29/2022   9:42 AM CDT  To: Chelsie Ramos LPN  Subject: med refill                                       Pt just spoke with the pharmacy and they did not receive the refill on pt's   beclomethasone (QVAR) 80 mcg/actuation Aero.     Please resend this med again to   [facility] [address] [place] [address]. Thank you!    Pt phone #: [phone]

## 2022-09-29 NOTE — TELEPHONE ENCOUNTER
Left message with patient at this time at [phone] explaining PA completed and awaiting response from insurance.

## 2022-09-29 NOTE — TELEPHONE ENCOUNTER
----- Message from Geri Montalvo Patient Care Assistant sent at 9/29/2022  9:46 AM CDT -----  Regarding: FW: med refill  Pt just called back again to say the pharmacy needs a PA sent along with the order please.  ----- Message -----  From: Geri Montalvo Patient Care Assistant  Sent: 9/29/2022   9:42 AM CDT  To: Chelsie Ramos LPN  Subject: med refill                                       Pt just spoke with the pharmacy and they did not receive the refill on pt's   beclomethasone (QVAR) 80 mcg/actuation Aero.     Please resend this med again to   [facility] [address] [place] [address]. Thank you!    Pt phone #: [phone]

## 2022-12-05 ENCOUNTER — TELEPHONE (OUTPATIENT)
Dept: INTERNAL MEDICINE | Facility: CLINIC | Age: 57
End: 2022-12-05
Payer: MEDICARE

## 2022-12-05 DIAGNOSIS — F17.200 READY TO QUIT SMOKING: Primary | ICD-10-CM

## 2022-12-05 RX ORDER — VARENICLINE TARTRATE 0.5 (11)-1
KIT ORAL
Qty: 1 EACH | Refills: 0 | Status: SHIPPED | OUTPATIENT
Start: 2022-12-05 | End: 2022-12-29

## 2022-12-05 NOTE — TELEPHONE ENCOUNTER
RX sent in for starter pack. Patient is aware to call once completed with the starter pack.  He will call once he is almost done.

## 2022-12-05 NOTE — TELEPHONE ENCOUNTER
----- Message from Geri Montalvo Patient Care Assistant sent at 12/5/2022  4:39 PM CST -----  Regarding: med to quit smoking  Type:  RX Refill Request    Who Called: pt    Refill or New Rx: new rx    RX Name and Strength: chantix starter pack and continuing pack    How is the patient currently taking it? (ex. 1XDay): n/a    Is this a 30 day or 90 day RX: n/a    Preferred Pharmacy with phone number: [facility] on [address]    Best Call Back Number: [phone]    Additional Information: pt is trying to quit smoking and he is having a hard time with it. Could you please send in the script for the starter pack and the continuing pack for this med please.

## 2022-12-29 ENCOUNTER — DOCUMENTATION ONLY (OUTPATIENT)
Dept: INTERNAL MEDICINE | Facility: CLINIC | Age: 57
End: 2022-12-29
Payer: MEDICARE

## 2022-12-29 ENCOUNTER — TELEPHONE (OUTPATIENT)
Dept: INTERNAL MEDICINE | Facility: CLINIC | Age: 57
End: 2022-12-29
Payer: MEDICARE

## 2022-12-29 DIAGNOSIS — F17.200 READY TO QUIT SMOKING: Primary | ICD-10-CM

## 2022-12-29 LAB
ALBUMIN: 4.8
ALK PHOS ISOENZYNMES: 46
ALT SERPL-CCNC: 18 U/L
AST: 24
BILIRUBIN, TOTAL: 0.6
BUN SERPL-MCNC: 18 MG/DL
CALCIUM SERPL-MCNC: 10.1 MG/DL
CHLORIDE: 102 MMOL/L
CHOLEST SERPL-MSCNC: 114 MG/DL (ref 0–200)
CHOLEST/HDLC SERPL: 2.2 {RATIO}
CO2 SERPL-SCNC: 26 MMOL/L
CREAT SERPL-MCNC: 1.2 MG/DL
GLUCOSE: 92
HBA1C MFR BLD: 5.1 % (ref 4–6)
HDLC SERPL-MCNC: 53 MG/DL
LDLC SERPL CALC-MCNC: 48 MG/DL
NON-HDL CHOLESTEROL: 61
POTASSIUM: 4.2 MMOL/L
SODIUM: 140 MMOL/L
TOTAL PROTEIN: 7.1 G/DL (ref 6.4–8.2)
TRIGL SERPL-MCNC: 96 MG/DL
VLDLC SERPL-MCNC: 19 MG/DL

## 2022-12-29 RX ORDER — VARENICLINE TARTRATE 1 MG/1
1 TABLET, FILM COATED ORAL 2 TIMES DAILY
Qty: 60 TABLET | Refills: 1 | Status: SHIPPED | OUTPATIENT
Start: 2022-12-29 | End: 2023-01-28

## 2023-01-05 PROBLEM — Z00.00 WELLNESS EXAMINATION: Status: ACTIVE | Noted: 2023-01-05

## 2023-01-05 NOTE — PROGRESS NOTES
Subjective:       Patient ID: Sharan Cadena is a 57 y.o. male.      Patient Care Team:  Hamilton Tyler II, MD as PCP - General (Internal Medicine)    Chief Complaint: Medicare AWV Follow Up, Diabetes, Hyperlipidemia, Sleep Apnea, Manic Behavior, and Schizophrenia    57-year-old male seen today for followup of schizophrenia, bipolar disorder, chronic neck and back pain, anxiety, sleep apnea, tobacco use, chronic rhinosinusitis, and peptic ulcer disease.    Review of Systems   Constitutional:  Negative for fever.   HENT:  Negative for nosebleeds.    Eyes:  Negative for visual disturbance.   Respiratory:  Negative for shortness of breath.    Cardiovascular:  Negative for chest pain.   Gastrointestinal:  Negative for abdominal pain.   Genitourinary:  Negative for dysuria.   Musculoskeletal:  Negative for gait problem.   Neurological:  Negative for headaches.         Patient Reported Health Risk Assessment  Are you male or female?: Male  During the past four weeks, how much have you been bothered by emotional problems such as feeling anxious, depressed, irritable, sad, or downhearted and blue?: Not at all  During the past five weeks, has your physical and/or emotional health limited your social activities with family, friends, neighbors, or groups?: Not at all  During the past four weeks, how much bodily pain have you generally had?: No pain  During the past four weeks, was someone available to help if you needed and wanted help?: Yes, as much as I wanted  During the past four weeks, what was the hardest physical activity you could do for at least two minutes?: Moderate  Can you get to places out of walking distance without help?  (For example, can you travel alone on buses or taxis, or drive your own car?): Yes  Can you go shopping for groceries or clothes without someone's help?: Yes  Can you prepare your own meals?: Yes  Can you do your own housework without help?: Yes  Because of any health problems, do you need  the help of another person with your personal care needs such as eating, bathing, dressing, or getting around the house?: No  Can you handle your own money without help?: Yes  During the past four weeks, how would you rate your health in general?: Good  How have things been going for you during the past four weeks?: Pretty well  Are you having difficulties driving your car?: No  Do you always fasten your seat belt when you are in a car?: Yes, usually  How often in the past four weeks have you been bothered by falling or dizzy when standing up?: Seldom  How often in the past four weeks have you been bothered by sexual problems?: Never  How often in the past four weeks have you been bothered by trouble eating well?: Never  How often in the past four weeks have you been bothered by teeth or denture problems?: Never  How often in the past four weeks have you been bothered with problems using the telephone?: Never  How often in the past four weeks have you been bothered by tiredness or fatigue?: Never  Have you fallen two or more times in the past year?: No  Are you afraid of falling?: No  Are you a smoker?: No  During the past four weeks, how many drinks of wine, beer, or other alcoholic beverages did you have?: No alcohol at all  Do you exercise for about 20 minutes three or more days a week?: Yes, most of the time  Have you been given any information to help you with hazards in your house that might hurt you?: No  Have you been given any information to help you with keeping track of your medications?: No  How often do you have trouble taking medicines the way you've been told to take them?: I always take them as prescribed  How confident are you that you can control and manage most of your health problems?: Somewhat confident  What is your race? (Check all that apply.):       Objective:      Physical Exam  HENT:      Head: Normocephalic.      Mouth/Throat:      Pharynx: Oropharynx is clear.   Eyes:       "Extraocular Movements: Extraocular movements intact.   Cardiovascular:      Rate and Rhythm: Normal rate and regular rhythm.   Pulmonary:      Breath sounds: Normal breath sounds.   Abdominal:      Palpations: Abdomen is soft.   Musculoskeletal:         General: No swelling.   Skin:     General: Skin is warm.   Neurological:      General: No focal deficit present.      Mental Status: He is alert and oriented to person, place, and time.   Psychiatric:         Mood and Affect: Mood normal.       Vitals:    01/06/23 0949   BP: 112/64   Pulse: 76   Resp: 14   Temp: 98.4 °F (36.9 °C)   SpO2: 98%   Weight: 88 kg (194 lb)   Height: 5' 7" (1.702 m)            No flowsheet data found.  Fall Risk Assessment - Outpatient 1/6/2023   Mobility Status Ambulatory   Number of falls 0   Identified as fall risk 0           Depression Screening  Over the past two weeks, has the patient felt down, depressed, or hopeless?: No  Over the past two weeks, has the patient felt little interest or pleasure in doing things?: No  Functional Ability/Safety Screening  Was the patient's timed Up & Go test unsteady or longer than 30 seconds?: No  Does the patient need help with phone, transportation, shopping, preparing meals, housework, laundry, meds, or managing money?: No  Does the patient's home have rugs in the hallway, lack grab bars in the bathroom, lack handrails on the stairs or have poor lighting?: No  Have you noticed any hearing difficulties?: No  Cognitive Function (Assessed through direct observation with due consideration of information obtained by way of patient reports and/or concerns raised by family, friends, caretakers, or others)    Does the patient repeat questions/statements in the same day?: No  Does the patient have trouble remembering the date, year, and time?: No  Does the patient have difficulty managing finances?: No  Does the patient have a decreased sense of direction?: No      Assessment:       Problem List Items " Addressed This Visit          Psychiatric    Bipolar disorder    Relevant Orders    CBC Auto Differential    Comprehensive Metabolic Panel    Lipid Panel    PSA, Screening    Urinalysis, Reflex to Urine Culture Urine, Clean Catch    TSH    Hemoglobin A1C    Schizophrenia    Relevant Orders    CBC Auto Differential    Comprehensive Metabolic Panel    Lipid Panel    PSA, Screening    Urinalysis, Reflex to Urine Culture Urine, Clean Catch    TSH    Hemoglobin A1C       Cardiac/Vascular    Other hyperlipidemia    Relevant Orders    CBC Auto Differential    Comprehensive Metabolic Panel    Lipid Panel    PSA, Screening    Urinalysis, Reflex to Urine Culture Urine, Clean Catch    TSH    Hemoglobin A1C       Renal/    Prostate cancer screening    Relevant Orders    CBC Auto Differential    Comprehensive Metabolic Panel    Lipid Panel    PSA, Screening    Urinalysis, Reflex to Urine Culture Urine, Clean Catch    TSH    Hemoglobin A1C       Endocrine    Diabetes mellitus - Primary    Relevant Orders    CBC Auto Differential    Comprehensive Metabolic Panel    Lipid Panel    PSA, Screening    Urinalysis, Reflex to Urine Culture Urine, Clean Catch    TSH    Hemoglobin A1C       Other    Wellness examination    Relevant Orders    CBC Auto Differential    Comprehensive Metabolic Panel    Lipid Panel    PSA, Screening    Urinalysis, Reflex to Urine Culture Urine, Clean Catch    TSH    Hemoglobin A1C         Medication List with Changes/Refills   Current Medications    ALPRAZOLAM (XANAX) 1 MG TABLET    Take 1 mg by mouth 3 (three) times daily as needed.    ARIPIPRAZOLE (ABILIFY) 10 MG TAB    Take 10 mg by mouth once daily.    ATORVASTATIN (LIPITOR) 40 MG TABLET    Take 40 mg by mouth.    AZELASTINE (ASTELIN) 137 MCG (0.1 %) NASAL SPRAY    by Nasal route.    BENZTROPINE (COGENTIN) 2 MG TAB    Take 2 mg by mouth.    FLUNISOLIDE 25 MCG, 0.025%, (NASALIDE) 25 MCG (0.025 %) SPRY    2 sprays by Nasal route 2 (two) times daily.     LINACLOTIDE (LINZESS) 290 MCG CAP CAPSULE    Take 1 capsule by mouth once daily    OXCARBAZEPINE (TRILEPTAL) 150 MG TAB    Take 150 mg by mouth 2 (two) times a day.    PANTOPRAZOLE (PROTONIX) 40 MG TABLET    Take 40 mg by mouth once daily.    QUETIAPINE (SEROQUEL) 400 MG TABLET    Take 800 mg by mouth.    TRAZODONE (DESYREL) 50 MG TABLET    Take 50 mg by mouth every evening.    VARENICLINE (CHANTIX) 1 MG TAB    Take 1 tablet (1 mg total) by mouth 2 (two) times daily.   Discontinued Medications    DIVALPROEX (DEPAKOTE) 500 MG TBEC    Take 1,500 mg by mouth.    METFORMIN (GLUCOPHAGE) 500 MG TABLET    Take 1 tablet by mouth once daily.        Plan:       1. Schizophrenia: He is seen by Dr. Thorne and his nurse practitioner regularly.      2. Chronic neck and back pain: Stable. He underwent sacroiliac joint fusion with Dr. Aldo Conway in 2019 with good results.     3. Sleep apnea: Continue CPAP.      4. Tobacco use: He was smoking 1 pack per day and is down to a 1/4 PPD, trying to quit, on Chantix currently.      5. Peptic ulcer disease: Continue omeprazole. He had an EGD about 6 years ago.     6. Chronic rhinosinusitis: He also has asthma. Because of ongoing cough, referred back to ENT at last visit and was given medication for allergies; Flonase was started     7. Hyperlipidemia: He is followed by cardiology. Continue atorvastatin     8. Diabetes: A1c 5.1.  He stopped metformin since last visit because of weight loss.  If levels are good next visit, we can consider having yearly visits thereafter     9. Edema: Lasix as needed     10. Asthma: Stable. Pneumovax 2019     11.  Chronic constipation: He has tried over-the-counter laxatives. SBO in 10/2021. Linzess working well     12. Wellness: Colonoscopy in 6/2017 with polyps, every 5 years. 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 Only plays at home  now      Medicare Annual Wellness and Personalized Prevention Plan:   Fall Risk + Home Safety + Hearing Impairment + Depression Screen + Cognitive Impairment Screen + Health Risk Assessment all reviewed    Health Maintenance Topics with due status: Not Due       Topic Last Completion Date    TETANUS VACCINE 04/26/2019    Lipid Panel 12/22/2022      The patient's Health Maintenance was reviewed and the following appears to be due at this time:   Health Maintenance Due   Topic Date Due    Hepatitis C Screening  Never done    HIV Screening  Never done    Shingles Vaccine (3 of 3) 09/14/2019    Colorectal Cancer Screening  06/02/2022       Advance Care Planning   I attest to discussing Advance Care Planning with patient and/or family member.  Education was provided including the importance of the Health Care Power of , Advance Directives, and/or [place] documentation.  The patient expressed understanding to the importance of this information and discussion.  Length of ACP conversation in minutes: 1       Opioid Screening: Patient medication list reviewed, patient is not taking prescription opioids. Patient is not using additional opioids than prescribed. Patient is at low risk of substance abuse based on this opioid use history.     No follow-ups on file. In addition to their scheduled follow up, the patient has also been instructed to follow up on as needed basis.

## 2023-01-06 ENCOUNTER — OFFICE VISIT (OUTPATIENT)
Dept: INTERNAL MEDICINE | Facility: CLINIC | Age: 58
End: 2023-01-06
Payer: MEDICARE

## 2023-01-06 VITALS
BODY MASS INDEX: 30.45 KG/M2 | HEIGHT: 67 IN | DIASTOLIC BLOOD PRESSURE: 64 MMHG | WEIGHT: 194 LBS | HEART RATE: 76 BPM | RESPIRATION RATE: 14 BRPM | SYSTOLIC BLOOD PRESSURE: 112 MMHG | TEMPERATURE: 98 F | OXYGEN SATURATION: 98 %

## 2023-01-06 DIAGNOSIS — E78.49 OTHER HYPERLIPIDEMIA: ICD-10-CM

## 2023-01-06 DIAGNOSIS — Z00.00 WELLNESS EXAMINATION: ICD-10-CM

## 2023-01-06 DIAGNOSIS — Z12.5 PROSTATE CANCER SCREENING: ICD-10-CM

## 2023-01-06 DIAGNOSIS — F31.9 BIPOLAR AFFECTIVE DISORDER, REMISSION STATUS UNSPECIFIED: ICD-10-CM

## 2023-01-06 DIAGNOSIS — F20.9 SCHIZOPHRENIA, UNSPECIFIED TYPE: ICD-10-CM

## 2023-01-06 DIAGNOSIS — E11.9 TYPE 2 DIABETES MELLITUS WITHOUT COMPLICATION, WITHOUT LONG-TERM CURRENT USE OF INSULIN: Primary | ICD-10-CM

## 2023-01-06 PROCEDURE — G0439 PPPS, SUBSEQ VISIT: HCPCS | Mod: ,,, | Performed by: INTERNAL MEDICINE

## 2023-01-06 PROCEDURE — 1159F PR MEDICATION LIST DOCUMENTED IN MEDICAL RECORD: ICD-10-PCS | Mod: CPTII,,, | Performed by: INTERNAL MEDICINE

## 2023-01-06 PROCEDURE — 1160F RVW MEDS BY RX/DR IN RCRD: CPT | Mod: CPTII,,, | Performed by: INTERNAL MEDICINE

## 2023-01-06 PROCEDURE — 3008F BODY MASS INDEX DOCD: CPT | Mod: CPTII,,, | Performed by: INTERNAL MEDICINE

## 2023-01-06 PROCEDURE — 1160F PR REVIEW ALL MEDS BY PRESCRIBER/CLIN PHARMACIST DOCUMENTED: ICD-10-PCS | Mod: CPTII,,, | Performed by: INTERNAL MEDICINE

## 2023-01-06 PROCEDURE — G0439 PR MEDICARE ANNUAL WELLNESS SUBSEQUENT VISIT: ICD-10-PCS | Mod: ,,, | Performed by: INTERNAL MEDICINE

## 2023-01-06 PROCEDURE — 3074F PR MOST RECENT SYSTOLIC BLOOD PRESSURE < 130 MM HG: ICD-10-PCS | Mod: CPTII,,, | Performed by: INTERNAL MEDICINE

## 2023-01-06 PROCEDURE — 3008F PR BODY MASS INDEX (BMI) DOCUMENTED: ICD-10-PCS | Mod: CPTII,,, | Performed by: INTERNAL MEDICINE

## 2023-01-06 PROCEDURE — 1159F MED LIST DOCD IN RCRD: CPT | Mod: CPTII,,, | Performed by: INTERNAL MEDICINE

## 2023-01-06 PROCEDURE — 3078F DIAST BP <80 MM HG: CPT | Mod: CPTII,,, | Performed by: INTERNAL MEDICINE

## 2023-01-06 PROCEDURE — 3074F SYST BP LT 130 MM HG: CPT | Mod: CPTII,,, | Performed by: INTERNAL MEDICINE

## 2023-01-06 PROCEDURE — 3078F PR MOST RECENT DIASTOLIC BLOOD PRESSURE < 80 MM HG: ICD-10-PCS | Mod: CPTII,,, | Performed by: INTERNAL MEDICINE

## 2023-01-06 RX ORDER — AZELASTINE 1 MG/ML
SPRAY, METERED NASAL
COMMUNITY
Start: 2022-03-31 | End: 2023-05-15

## 2023-02-02 ENCOUNTER — DOCUMENTATION ONLY (OUTPATIENT)
Dept: INTERNAL MEDICINE | Facility: CLINIC | Age: 58
End: 2023-02-02

## 2023-02-02 LAB
LEFT EYE DM RETINOPATHY: NEGATIVE
RIGHT EYE DM RETINOPATHY: NEGATIVE

## 2023-02-27 ENCOUNTER — TELEPHONE (OUTPATIENT)
Dept: INTERNAL MEDICINE | Facility: CLINIC | Age: 58
End: 2023-02-27
Payer: MEDICARE

## 2023-02-27 NOTE — TELEPHONE ENCOUNTER
Left message with patient at [phone] at this time. Explaining in detail to push fluids and hold off on lasix for awhile. Monitor BP bid x 1 week and get back to me.

## 2023-02-27 NOTE — TELEPHONE ENCOUNTER
Increase fluid intake.    Monitor blood pressure twice a day for 1 week.  Avoid diuretics.  He was taking Lasix as needed in the past

## 2023-04-04 ENCOUNTER — TELEPHONE (OUTPATIENT)
Dept: INTERNAL MEDICINE | Facility: CLINIC | Age: 58
End: 2023-04-04
Payer: MEDICARE

## 2023-04-04 DIAGNOSIS — R42 DIZZINESS: Primary | ICD-10-CM

## 2023-04-04 DIAGNOSIS — I95.9 HYPOTENSION, UNSPECIFIED HYPOTENSION TYPE: ICD-10-CM

## 2023-04-04 RX ORDER — OLANZAPINE 10 MG/1
1 TABLET ORAL DAILY
COMMUNITY
Start: 2023-01-17 | End: 2023-05-15

## 2023-04-04 RX ORDER — FENOFIBRATE 145 MG/1
145 TABLET, FILM COATED ORAL DAILY
COMMUNITY
Start: 2023-03-29

## 2023-04-04 RX ORDER — ALPRAZOLAM 2 MG/1
TABLET ORAL
COMMUNITY
Start: 2023-01-17 | End: 2023-07-12

## 2023-04-04 NOTE — TELEPHONE ENCOUNTER
Spoke with patient at this time. He is not on any BP meds or fluid pills at this time. Has been increasing his water intake since we spoke last. I adivsed he reach out to Dr. Weller as his BP is low and to make sure it isn't anything with his heart that's causing the dropin BP.

## 2023-04-04 NOTE — TELEPHONE ENCOUNTER
----- Message from Sheree Mayda sent at 4/4/2023 11:27 AM CDT -----  Regarding: referral  Type:  Patient Requesting Referral    Who Called:pt  Does the patient already have the specialty appointment scheduled?:  If yes, what is the date of that appointment?:  Referral to What Specialty:cardiologist  Reason for Referral:low b/p & dizziness  Does the patient want the referral with a specific physician?:dr hernandez  Is the specialist an [facility]?:  Patient Requesting a Response?:yes  Would the patient rather a call back or a response via [facility]? C/b  Best Call Back Number:[phone]  Additional Information: pt was advised by pcp to contact cardiologist.  When pt contacted cardiologist to make an appt he was told he needed a referral from pcp  Please send referral to dr hernandez

## 2023-04-04 NOTE — TELEPHONE ENCOUNTER
He is not on blood pressure medication.  I reviewed his medication list.  Make sure he is he is not taking Lasix or any other diuretics.  Have him increase fluid intake. Maybe just not drinking enough

## 2023-04-04 NOTE — TELEPHONE ENCOUNTER
----- Message from Sheree Ohara sent at 4/4/2023 10:47 AM CDT -----  Regarding: advice  Type:  Needs Medical Advice    Who Called: pt    Would the patient rather a call back or a response via [facility]? C/b  Best Call Back Number: [phone]  Additional Information: pt was asked to track b/p from Feb until now    Pt's lowest 91/66 on 3/4  highest 104/73  on 2/27  B/p has been running between these numbers constantly fluctuating pt is still and has been dizzy   Dizziness is chronic never has completely gone.

## 2023-04-10 PROBLEM — Z00.00 WELLNESS EXAMINATION: Status: RESOLVED | Noted: 2023-01-05 | Resolved: 2023-04-10

## 2023-05-15 ENCOUNTER — OFFICE VISIT (OUTPATIENT)
Dept: INTERNAL MEDICINE | Facility: CLINIC | Age: 58
End: 2023-05-15
Payer: MEDICARE

## 2023-05-15 ENCOUNTER — TELEPHONE (OUTPATIENT)
Dept: INTERNAL MEDICINE | Facility: CLINIC | Age: 58
End: 2023-05-15

## 2023-05-15 VITALS
OXYGEN SATURATION: 98 % | RESPIRATION RATE: 14 BRPM | SYSTOLIC BLOOD PRESSURE: 100 MMHG | TEMPERATURE: 98 F | BODY MASS INDEX: 30.76 KG/M2 | HEART RATE: 64 BPM | WEIGHT: 196 LBS | DIASTOLIC BLOOD PRESSURE: 68 MMHG | HEIGHT: 67 IN

## 2023-05-15 DIAGNOSIS — R10.9 LEFT FLANK PAIN: Primary | ICD-10-CM

## 2023-05-15 DIAGNOSIS — R31.0 GROSS HEMATURIA: ICD-10-CM

## 2023-05-15 PROCEDURE — 3008F BODY MASS INDEX DOCD: CPT | Mod: CPTII,,, | Performed by: INTERNAL MEDICINE

## 2023-05-15 PROCEDURE — 3078F DIAST BP <80 MM HG: CPT | Mod: CPTII,,, | Performed by: INTERNAL MEDICINE

## 2023-05-15 PROCEDURE — 96372 PR INJECTION,THERAP/PROPH/DIAG2ST, IM OR SUBCUT: ICD-10-PCS | Mod: ,,, | Performed by: INTERNAL MEDICINE

## 2023-05-15 PROCEDURE — 3078F PR MOST RECENT DIASTOLIC BLOOD PRESSURE < 80 MM HG: ICD-10-PCS | Mod: CPTII,,, | Performed by: INTERNAL MEDICINE

## 2023-05-15 PROCEDURE — 3008F PR BODY MASS INDEX (BMI) DOCUMENTED: ICD-10-PCS | Mod: CPTII,,, | Performed by: INTERNAL MEDICINE

## 2023-05-15 PROCEDURE — 3074F PR MOST RECENT SYSTOLIC BLOOD PRESSURE < 130 MM HG: ICD-10-PCS | Mod: CPTII,,, | Performed by: INTERNAL MEDICINE

## 2023-05-15 PROCEDURE — 99213 OFFICE O/P EST LOW 20 MIN: CPT | Mod: 25,,, | Performed by: INTERNAL MEDICINE

## 2023-05-15 PROCEDURE — 1159F PR MEDICATION LIST DOCUMENTED IN MEDICAL RECORD: ICD-10-PCS | Mod: CPTII,,, | Performed by: INTERNAL MEDICINE

## 2023-05-15 PROCEDURE — 1160F RVW MEDS BY RX/DR IN RCRD: CPT | Mod: CPTII,,, | Performed by: INTERNAL MEDICINE

## 2023-05-15 PROCEDURE — 1160F PR REVIEW ALL MEDS BY PRESCRIBER/CLIN PHARMACIST DOCUMENTED: ICD-10-PCS | Mod: CPTII,,, | Performed by: INTERNAL MEDICINE

## 2023-05-15 PROCEDURE — 99213 PR OFFICE/OUTPT VISIT, EST, LEVL III, 20-29 MIN: ICD-10-PCS | Mod: 25,,, | Performed by: INTERNAL MEDICINE

## 2023-05-15 PROCEDURE — 3074F SYST BP LT 130 MM HG: CPT | Mod: CPTII,,, | Performed by: INTERNAL MEDICINE

## 2023-05-15 PROCEDURE — 1159F MED LIST DOCD IN RCRD: CPT | Mod: CPTII,,, | Performed by: INTERNAL MEDICINE

## 2023-05-15 PROCEDURE — 96372 THER/PROPH/DIAG INJ SC/IM: CPT | Mod: ,,, | Performed by: INTERNAL MEDICINE

## 2023-05-15 RX ORDER — QUETIAPINE 300 MG/1
2 TABLET, FILM COATED, EXTENDED RELEASE ORAL NIGHTLY
COMMUNITY
Start: 2023-05-10

## 2023-05-15 RX ORDER — OLANZAPINE 15 MG/1
1 TABLET ORAL DAILY
COMMUNITY
Start: 2023-05-10

## 2023-05-15 RX ORDER — TRAZODONE HYDROCHLORIDE 150 MG/1
1 TABLET ORAL DAILY
COMMUNITY
Start: 2023-04-05 | End: 2023-07-12

## 2023-05-15 RX ORDER — KETOROLAC TROMETHAMINE 30 MG/ML
60 INJECTION, SOLUTION INTRAMUSCULAR; INTRAVENOUS ONCE
Status: COMPLETED | OUTPATIENT
Start: 2023-05-15 | End: 2023-05-15

## 2023-05-15 RX ADMIN — KETOROLAC TROMETHAMINE 60 MG: 30 INJECTION, SOLUTION INTRAMUSCULAR; INTRAVENOUS at 10:05

## 2023-05-15 NOTE — PROGRESS NOTES
"Subjective:       Patient ID: Sharan Cadena is a 57 y.o. male.    Chief Complaint: Hematuria and Back Pain    57-year-old male reports gross hematuria and left flank pain over the past 3-4 weeks.  He has a history of kidney stones and also history of bowel obstruction.    Review of Systems   Constitutional:  Negative for fever.   HENT:  Negative for nosebleeds.    Eyes:  Negative for visual disturbance.   Respiratory:  Negative for shortness of breath.    Cardiovascular:  Negative for chest pain.   Gastrointestinal:  Positive for abdominal pain.   Genitourinary:  Positive for hematuria. Negative for dysuria.   Musculoskeletal:  Negative for gait problem.   Neurological:  Negative for headaches.       Objective:      Physical Exam  Eyes:      Extraocular Movements: Extraocular movements intact.   Pulmonary:      Effort: No respiratory distress.   Abdominal:      Palpations: Abdomen is soft.   Musculoskeletal:         General: No swelling.      Comments: Left flank pain   Skin:     General: Skin is warm.   Neurological:      Mental Status: He is alert and oriented to person, place, and time.   Psychiatric:         Mood and Affect: Mood normal.       Vitals:    05/15/23 1009   BP: 100/68   Pulse: 64   Resp: 14   Temp: 98.2 °F (36.8 °C)   SpO2: 98%   Weight: 88.9 kg (196 lb)   Height: 5' 7" (1.702 m)      Assessment:       Problem List Items Addressed This Visit    None  Visit Diagnoses       Left flank pain    -  Primary    Gross hematuria                Medication List with Changes/Refills   Current Medications    ALPRAZOLAM (XANAX) 2 MG TAB    Take by mouth.    ATORVASTATIN (LIPITOR) 40 MG TABLET    Take 40 mg by mouth.    BENZTROPINE (COGENTIN) 2 MG TAB    Take 2 mg by mouth.    FENOFIBRATE (TRICOR) 145 MG TABLET    Take by mouth.    FLUNISOLIDE 25 MCG, 0.025%, (NASALIDE) 25 MCG (0.025 %) SPRY    2 sprays by Nasal route 2 (two) times daily.    LINACLOTIDE (LINZESS) 290 MCG CAP CAPSULE    Take 1 capsule by mouth " once daily    OLANZAPINE (ZYPREXA) 15 MG TAB    Take 1 tablet by mouth Daily.    OXCARBAZEPINE (TRILEPTAL) 150 MG TAB    Take 150 mg by mouth 2 (two) times a day.    PANTOPRAZOLE (PROTONIX) 40 MG TABLET    Take 40 mg by mouth once daily.    QUETIAPINE (SEROQUEL XR) 300 MG TB24    Take 2 tablets by mouth Daily.    TRAZODONE (DESYREL) 150 MG TABLET    Take 1 tablet by mouth Daily.   Discontinued Medications    ARIPIPRAZOLE (ABILIFY) 10 MG TAB    Take 10 mg by mouth once daily.    AZELASTINE (ASTELIN) 137 MCG (0.1 %) NASAL SPRAY    by Nasal route.    OLANZAPINE (ZYPREXA) 10 MG TABLET    Take 1 tablet by mouth once daily.    QUETIAPINE (SEROQUEL) 400 MG TABLET    Take 800 mg by mouth.    TRAZODONE (DESYREL) 50 MG TABLET    Take 50 mg by mouth every evening.        Plan:       Gross hematuria   Left flank pain     CT abdomen and pelvis, Toradol injection today

## 2023-05-18 LAB — HBA1C MFR BLD: 5.2 % (ref 4–6)

## 2023-05-22 ENCOUNTER — TELEPHONE (OUTPATIENT)
Dept: INTERNAL MEDICINE | Facility: CLINIC | Age: 58
End: 2023-05-22
Payer: MEDICARE

## 2023-05-22 DIAGNOSIS — Z00.00 WELLNESS EXAMINATION: Primary | ICD-10-CM

## 2023-05-22 RX ORDER — NYSTATIN 100000 [USP'U]/ML
4 SUSPENSION ORAL 3 TIMES DAILY
Qty: 60 ML | Refills: 0 | Status: SHIPPED | OUTPATIENT
Start: 2023-05-22 | End: 2023-05-27

## 2023-05-22 NOTE — TELEPHONE ENCOUNTER
----- Message from Sheree Ohara sent at 5/22/2023 12:20 PM CDT -----  Regarding: advice  Type:  Needs Medical Advice    Who Called: pt  Symptoms (please be specific): white on back of tongue,  and it is irritated   How long has patient had these symptoms:  3 days ago  Pharmacy name and phone #:  [email or web address]  Would the patient rather a call back or a response via [facility]? C/b  Best Call Back Number: [phone]  Additional Information: pharmacist told pt it is a yeast infection from inhaler and to contact pcp for antibiotics

## 2023-05-24 ENCOUNTER — TELEPHONE (OUTPATIENT)
Dept: INTERNAL MEDICINE | Facility: CLINIC | Age: 58
End: 2023-05-24
Payer: MEDICARE

## 2023-05-24 DIAGNOSIS — E11.9 TYPE 2 DIABETES MELLITUS WITHOUT COMPLICATION, WITHOUT LONG-TERM CURRENT USE OF INSULIN: Primary | ICD-10-CM

## 2023-05-24 RX ORDER — SEMAGLUTIDE 0.68 MG/ML
0.25 INJECTION, SOLUTION SUBCUTANEOUS
Qty: 1.5 ML | Refills: 0 | Status: SHIPPED | OUTPATIENT
Start: 2023-05-24 | End: 2023-06-23

## 2023-05-24 RX ORDER — SEMAGLUTIDE 0.68 MG/ML
0.5 INJECTION, SOLUTION SUBCUTANEOUS
Qty: 3 ML | Refills: 5 | Status: SHIPPED | OUTPATIENT
Start: 2023-06-23 | End: 2023-07-23

## 2023-05-25 ENCOUNTER — DOCUMENTATION ONLY (OUTPATIENT)
Dept: ADMINISTRATIVE | Facility: HOSPITAL | Age: 58
End: 2023-05-25
Payer: MEDICARE

## 2023-05-26 ENCOUNTER — HOSPITAL ENCOUNTER (OUTPATIENT)
Dept: RADIOLOGY | Facility: HOSPITAL | Age: 58
Discharge: HOME | End: 2023-05-26
Attending: INTERNAL MEDICINE
Payer: MEDICARE

## 2023-05-26 DIAGNOSIS — R10.9 LEFT FLANK PAIN: ICD-10-CM

## 2023-05-26 DIAGNOSIS — R31.0 GROSS HEMATURIA: ICD-10-CM

## 2023-05-26 PROCEDURE — 74176 CT ABD & PELVIS W/O CONTRAST: CPT | Mod: TC

## 2023-05-27 RX ORDER — TRAMADOL HYDROCHLORIDE 50 MG/1
50 TABLET ORAL EVERY 6 HOURS
Qty: 15 TABLET | Refills: 0 | Status: SHIPPED | OUTPATIENT
Start: 2023-05-27 | End: 2023-11-14

## 2023-05-27 RX ORDER — TAMSULOSIN HYDROCHLORIDE 0.4 MG/1
0.4 CAPSULE ORAL DAILY
Qty: 30 CAPSULE | Refills: 0 | Status: SHIPPED | OUTPATIENT
Start: 2023-05-27 | End: 2023-11-14

## 2023-05-27 NOTE — PROGRESS NOTES
Patient called with complaints of excruciating pain because of of 4 mm kidney stone that was confirmed on a CT scan.  Called in Flomax as well as tramadol p.r.n. pain.    Patient educated on the importance of drinking a lot of water and hydration.    Follow-up with calling primary care On Tuesday     Medications Ordered This Encounter   Medications    tamsulosin (FLOMAX) 0.4 mg Cap     Sig: Take 1 capsule (0.4 mg total) by mouth once daily.     Dispense:  30 capsule     Refill:  0    traMADoL (ULTRAM) 50 mg tablet     Sig: Take 1 tablet (50 mg total) by mouth every 6 (six) hours.     Dispense:  15 tablet     Refill:  0     Quantity prescribed more than 7 day supply? Yes, quantity medically necessary     Order Specific Question:   I have reviewed the Prescription Drug Monitoring Program (PDMP) database for this patient prior to prescribing the above opioid medication     Answer:   Yes

## 2023-05-30 ENCOUNTER — TELEPHONE (OUTPATIENT)
Dept: INTERNAL MEDICINE | Facility: CLINIC | Age: 58
End: 2023-05-30
Payer: MEDICARE

## 2023-05-30 NOTE — TELEPHONE ENCOUNTER
If pain has resolved, then no need to see Urology.  Consider urinalysis next week if still having symptoms

## 2023-05-30 NOTE — TELEPHONE ENCOUNTER
----- Message from Hamilton Tyler II, MD sent at 5/29/2023  7:14 PM CDT -----  CT abd/pelvis from 5/26 showed a large stone on the left side. He spoke with Dr. Garcia Friday. Please call him. If he is still in pain and did not pass the stone then he needs to see urology asap

## 2023-05-30 NOTE — TELEPHONE ENCOUNTER
Patient would like to be referred to Dr. Ramírez with [facility].    F# [phone]

## 2023-05-30 NOTE — TELEPHONE ENCOUNTER
Spoke with patient at this time. He had reaction to flomax x 2 and is unable to take it. Occasional spasm and mild pain. Didn't even need to take tramadol. Thinks he may have passed it as it was difficult to urinate but that was resolved. Do we still refer to urology?

## 2023-06-12 ENCOUNTER — TELEPHONE (OUTPATIENT)
Dept: INTERNAL MEDICINE | Facility: CLINIC | Age: 58
End: 2023-06-12
Payer: MEDICARE

## 2023-07-07 ENCOUNTER — LAB VISIT (OUTPATIENT)
Dept: LAB | Facility: HOSPITAL | Age: 58
End: 2023-07-07
Attending: INTERNAL MEDICINE
Payer: MEDICARE

## 2023-07-07 DIAGNOSIS — E78.49 OTHER HYPERLIPIDEMIA: ICD-10-CM

## 2023-07-07 DIAGNOSIS — F31.9 BIPOLAR AFFECTIVE DISORDER, REMISSION STATUS UNSPECIFIED: ICD-10-CM

## 2023-07-07 DIAGNOSIS — F20.9 SCHIZOPHRENIA, UNSPECIFIED TYPE: ICD-10-CM

## 2023-07-07 DIAGNOSIS — E11.9 TYPE 2 DIABETES MELLITUS WITHOUT COMPLICATION, WITHOUT LONG-TERM CURRENT USE OF INSULIN: ICD-10-CM

## 2023-07-07 DIAGNOSIS — Z00.00 WELLNESS EXAMINATION: ICD-10-CM

## 2023-07-07 DIAGNOSIS — Z12.5 PROSTATE CANCER SCREENING: ICD-10-CM

## 2023-07-07 LAB
ALBUMIN SERPL-MCNC: 4.5 G/DL (ref 3.5–5)
ALBUMIN/GLOB SERPL: 1.8 RATIO (ref 1.1–2)
ALP SERPL-CCNC: 44 UNIT/L (ref 40–150)
ALT SERPL-CCNC: 17 UNIT/L (ref 0–55)
APPEARANCE UR: CLEAR
AST SERPL-CCNC: 20 UNIT/L (ref 5–34)
BACTERIA #/AREA URNS AUTO: NORMAL /HPF
BASOPHILS # BLD AUTO: 0.06 X10(3)/MCL
BASOPHILS NFR BLD AUTO: 0.7 %
BILIRUB UR QL STRIP.AUTO: NEGATIVE MG/DL
BILIRUBIN DIRECT+TOT PNL SERPL-MCNC: 0.6 MG/DL
BUN SERPL-MCNC: 24.2 MG/DL (ref 8.4–25.7)
CALCIUM SERPL-MCNC: 10.3 MG/DL (ref 8.4–10.2)
CHLORIDE SERPL-SCNC: 110 MMOL/L (ref 98–107)
CHOLEST SERPL-MCNC: 106 MG/DL
CHOLEST/HDLC SERPL: 3 {RATIO} (ref 0–5)
CO2 SERPL-SCNC: 24 MMOL/L (ref 22–29)
COLOR UR: YELLOW
CREAT SERPL-MCNC: 1.17 MG/DL (ref 0.73–1.18)
CREAT UR-MCNC: 64.2 MG/DL (ref 63–166)
EOSINOPHIL # BLD AUTO: 0.28 X10(3)/MCL (ref 0–0.9)
EOSINOPHIL NFR BLD AUTO: 3.4 %
ERYTHROCYTE [DISTWIDTH] IN BLOOD BY AUTOMATED COUNT: 13.9 % (ref 11.5–17)
EST. AVERAGE GLUCOSE BLD GHB EST-MCNC: 99.7 MG/DL
GFR SERPLBLD CREATININE-BSD FMLA CKD-EPI: >60 MLS/MIN/1.73/M2
GLOBULIN SER-MCNC: 2.5 GM/DL (ref 2.4–3.5)
GLUCOSE SERPL-MCNC: 94 MG/DL (ref 74–100)
GLUCOSE UR QL STRIP.AUTO: NEGATIVE MG/DL
HBA1C MFR BLD: 5.1 %
HCT VFR BLD AUTO: 45.3 % (ref 42–52)
HDLC SERPL-MCNC: 34 MG/DL (ref 35–60)
HGB BLD-MCNC: 14.8 G/DL (ref 14–18)
IMM GRANULOCYTES # BLD AUTO: 0.02 X10(3)/MCL (ref 0–0.04)
IMM GRANULOCYTES NFR BLD AUTO: 0.2 %
KETONES UR QL STRIP.AUTO: NEGATIVE MG/DL
LDLC SERPL CALC-MCNC: 47 MG/DL (ref 50–140)
LEUKOCYTE ESTERASE UR QL STRIP.AUTO: NEGATIVE UNIT/L
LYMPHOCYTES # BLD AUTO: 2.79 X10(3)/MCL (ref 0.6–4.6)
LYMPHOCYTES NFR BLD AUTO: 33.4 %
MCH RBC QN AUTO: 31.6 PG (ref 27–31)
MCHC RBC AUTO-ENTMCNC: 32.7 G/DL (ref 33–36)
MCV RBC AUTO: 96.8 FL (ref 80–94)
MICROALBUMIN UR-MCNC: <5 UG/ML
MICROALBUMIN/CREAT RATIO PNL UR: <7.8 MG/GM CR (ref 0–30)
MONOCYTES # BLD AUTO: 0.88 X10(3)/MCL (ref 0.1–1.3)
MONOCYTES NFR BLD AUTO: 10.5 %
NEUTROPHILS # BLD AUTO: 4.32 X10(3)/MCL (ref 2.1–9.2)
NEUTROPHILS NFR BLD AUTO: 51.8 %
NITRITE UR QL STRIP.AUTO: NEGATIVE
NRBC BLD AUTO-RTO: 0 %
PH UR STRIP.AUTO: 6.5 [PH]
PLATELET # BLD AUTO: 204 X10(3)/MCL (ref 130–400)
PMV BLD AUTO: 11.3 FL (ref 7.4–10.4)
POTASSIUM SERPL-SCNC: 4.3 MMOL/L (ref 3.5–5.1)
PROT SERPL-MCNC: 7 GM/DL (ref 6.4–8.3)
PROT UR QL STRIP.AUTO: NEGATIVE MG/DL
PSA SERPL-MCNC: 0.7 NG/ML
RBC # BLD AUTO: 4.68 X10(6)/MCL (ref 4.7–6.1)
RBC #/AREA URNS AUTO: <5 /HPF
RBC UR QL AUTO: NEGATIVE UNIT/L
SODIUM SERPL-SCNC: 140 MMOL/L (ref 136–145)
SP GR UR STRIP.AUTO: 1.01 (ref 1–1.03)
SQUAMOUS #/AREA URNS AUTO: <5 /HPF
TRIGL SERPL-MCNC: 126 MG/DL (ref 34–140)
TSH SERPL-ACNC: 1.32 UIU/ML (ref 0.35–4.94)
UROBILINOGEN UR STRIP-ACNC: 1 MG/DL
VLDLC SERPL CALC-MCNC: 25 MG/DL
WBC # SPEC AUTO: 8.35 X10(3)/MCL (ref 4.5–11.5)
WBC #/AREA URNS AUTO: <5 /HPF

## 2023-07-07 PROCEDURE — 84443 ASSAY THYROID STIM HORMONE: CPT

## 2023-07-07 PROCEDURE — 84153 ASSAY OF PSA TOTAL: CPT

## 2023-07-07 PROCEDURE — 83036 HEMOGLOBIN GLYCOSYLATED A1C: CPT

## 2023-07-07 PROCEDURE — 81001 URINALYSIS AUTO W/SCOPE: CPT

## 2023-07-07 PROCEDURE — 85025 COMPLETE CBC W/AUTO DIFF WBC: CPT

## 2023-07-07 PROCEDURE — 80061 LIPID PANEL: CPT

## 2023-07-07 PROCEDURE — 36415 COLL VENOUS BLD VENIPUNCTURE: CPT

## 2023-07-07 PROCEDURE — 82043 UR ALBUMIN QUANTITATIVE: CPT

## 2023-07-07 PROCEDURE — 80053 COMPREHEN METABOLIC PANEL: CPT

## 2023-07-11 NOTE — PROGRESS NOTES
"A for select Subjective:       Patient ID: Sharan Cadena is a 57 y.o. male.      Patient Care Team:  Hamilton Tyler II, MD as PCP - General (Internal Medicine)    Chief Complaint: Fatigue, Diabetes, Hyperlipidemia, and Gastroesophageal Reflux    57-year-old male seen today for followup of schizophrenia, bipolar disorder, chronic neck and back pain, anxiety, sleep apnea, tobacco use, chronic rhinosinusitis, and peptic ulcer disease.    Review of Systems   Constitutional:  Negative for fever.   HENT:  Negative for nosebleeds.    Eyes:  Negative for visual disturbance.   Respiratory:  Negative for shortness of breath.    Cardiovascular:  Negative for chest pain.   Gastrointestinal:  Negative for abdominal pain.   Genitourinary:  Negative for dysuria.   Musculoskeletal:  Negative for gait problem.   Neurological:  Negative for headaches.         Patient Reported Health Risk Assessment         Objective:      Physical Exam  HENT:      Head: Normocephalic.      Mouth/Throat:      Pharynx: Oropharynx is clear.   Eyes:      Extraocular Movements: Extraocular movements intact.   Cardiovascular:      Rate and Rhythm: Normal rate and regular rhythm.   Pulmonary:      Breath sounds: Normal breath sounds.   Abdominal:      Palpations: Abdomen is soft.   Musculoskeletal:         General: No swelling.   Skin:     General: Skin is warm.   Neurological:      General: No focal deficit present.      Mental Status: He is alert and oriented to person, place, and time.   Psychiatric:         Mood and Affect: Mood normal.       Vitals:    07/12/23 1010   BP: 112/66   Pulse: 72   Resp: 14   Temp: 98.2 °F (36.8 °C)   SpO2: 98%   Weight: 89.4 kg (197 lb)   Height: 5' 7" (1.702 m)              No flowsheet data found.  Fall Risk Assessment - Outpatient 7/12/2023 5/15/2023 1/6/2023   Mobility Status Ambulatory Ambulatory Ambulatory   Number of falls 0 0 0   Identified as fall risk 0 0 0                  Assessment:       Problem List Items " Addressed This Visit          Psychiatric    Bipolar disorder - Primary    Schizophrenia       Cardiac/Vascular    Other hyperlipidemia       Renal/    Prostate cancer screening       Endocrine    Diabetes mellitus       Other    Sleep apnea    RESOLVED: Wellness examination         Medication List with Changes/Refills   New Medications    ALPRAZOLAM (XANAX) 1 MG TABLET    Take 1 tablet (1 mg total) by mouth every evening.   Current Medications    ATORVASTATIN (LIPITOR) 40 MG TABLET    Take 40 mg by mouth.    BENZTROPINE (COGENTIN) 2 MG TAB    Take 2 mg by mouth.    FENOFIBRATE (TRICOR) 145 MG TABLET    Take by mouth.    FLUNISOLIDE 25 MCG, 0.025%, (NASALIDE) 25 MCG (0.025 %) SPRY    2 sprays by Nasal route 2 (two) times daily.    LINACLOTIDE (LINZESS) 290 MCG CAP CAPSULE    Take 1 capsule by mouth once daily    OLANZAPINE (ZYPREXA) 15 MG TAB    Take 1 tablet by mouth Daily.    OXCARBAZEPINE (TRILEPTAL) 150 MG TAB    Take 150 mg by mouth 2 (two) times a day.    PANTOPRAZOLE (PROTONIX) 40 MG TABLET    Take 40 mg by mouth once daily.    QUETIAPINE (SEROQUEL XR) 300 MG TB24    Take 2 tablets by mouth Daily.    SEMAGLUTIDE (OZEMPIC) 0.25 MG OR 0.5 MG (2 MG/3 ML) PEN INJECTOR    Inject 0.5 mg into the skin every 7 days.    TAMSULOSIN (FLOMAX) 0.4 MG CAP    Take 1 capsule (0.4 mg total) by mouth once daily.    TRAMADOL (ULTRAM) 50 MG TABLET    Take 1 tablet (50 mg total) by mouth every 6 (six) hours.   Discontinued Medications    ALPRAZOLAM (XANAX) 2 MG TAB    Take by mouth.    TRAZODONE (DESYREL) 150 MG TABLET    Take 1 tablet by mouth Daily.        Plan:       1. Schizophrenia: He is seen by Dr. Thorne and his nurse practitioner regularly.      2. Chronic neck and back pain: Stable. He underwent sacroiliac joint fusion with Dr. Aldo Conway in 2019 with good results.     3. Sleep apnea: Continue CPAP.      4. Tobacco use: He was smoking 1 pack per day and is down to a 1/2 PPD, trying to cut back; tried Chantix in 2023      5. Peptic ulcer disease: Continue omeprazole. He had an EGD about 6 years ago.     6. Chronic rhinosinusitis: He also has asthma. Because of ongoing cough, referred back to ENT and was given medication for allergies; Flonase was started     7. Hyperlipidemia: He is followed by cardiology. Continue atorvastatin     8. Diabetes: A1c 5.1 again.  He stopped metformin in 2022 because of weight loss.  On Ozempic    9. Edema: Lasix as needed     10. Asthma: Stable. Pneumovax 2019     11.  Chronic constipation: He has tried over-the-counter laxatives. SBO in 10/2021. Linzess working well     12. Recurrent nephrolithiasis: Last episode 5/2023, Dr. Silas Ramírez following    13. Wellness: Colonoscopy in 6/2017 with polyps, every 5 years. Pneumovax 2019     He has been taking Xanax 2 mg nightly for many years and would like to get on a lower dose of Xanax and possibly discontinue.  Decrease Xanax to 1 mg.     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 Only plays at home now      Medicare Annual Wellness and Personalized Prevention Plan:   Fall Risk + Home Safety + Hearing Impairment + Depression Screen + Cognitive Impairment Screen + Health Risk Assessment all reviewed    Health Maintenance Topics with due status: Not Due       Topic Last Completion Date    TETANUS VACCINE 04/26/2019    Influenza Vaccine 10/07/2022    Eye Exam 02/02/2023    Low Dose Statin 05/15/2023    Diabetes Urine Screening 07/07/2023    Lipid Panel 07/07/2023    Hemoglobin A1c 07/07/2023      The patient's Health Maintenance was reviewed and the following appears to be due at this time:   Health Maintenance Due   Topic Date Due    Hepatitis C Screening  Never done    Foot Exam  Never done    HIV Screening  Never done    Shingles Vaccine (3 of 3) 09/14/2019    Colorectal Cancer Screening  06/02/2022       Advance Care Planning   I attest to discussing Advance Care Planning with patient and/or family  member.  Education was provided including the importance of the Health Care Power of , Advance Directives, and/or [place] documentation.  The patient expressed understanding to the importance of this information and discussion.  Length of ACP conversation in minutes: 0       Opioid Screening: Patient medication list reviewed, patient is not taking prescription opioids. Patient is not using additional opioids than prescribed. Patient is at low risk of substance abuse based on this opioid use history.     No follow-ups on file. In addition to their scheduled follow up, the patient has also been instructed to follow up on as needed basis.

## 2023-07-12 ENCOUNTER — OFFICE VISIT (OUTPATIENT)
Dept: INTERNAL MEDICINE | Facility: CLINIC | Age: 58
End: 2023-07-12
Payer: MEDICARE

## 2023-07-12 ENCOUNTER — TELEPHONE (OUTPATIENT)
Dept: INTERNAL MEDICINE | Facility: CLINIC | Age: 58
End: 2023-07-12

## 2023-07-12 VITALS
TEMPERATURE: 98 F | HEART RATE: 72 BPM | OXYGEN SATURATION: 98 % | BODY MASS INDEX: 30.92 KG/M2 | WEIGHT: 197 LBS | SYSTOLIC BLOOD PRESSURE: 112 MMHG | HEIGHT: 67 IN | DIASTOLIC BLOOD PRESSURE: 66 MMHG | RESPIRATION RATE: 14 BRPM

## 2023-07-12 DIAGNOSIS — E78.49 OTHER HYPERLIPIDEMIA: ICD-10-CM

## 2023-07-12 DIAGNOSIS — F31.9 BIPOLAR AFFECTIVE DISORDER, REMISSION STATUS UNSPECIFIED: Primary | ICD-10-CM

## 2023-07-12 DIAGNOSIS — G47.33 OBSTRUCTIVE SLEEP APNEA SYNDROME: ICD-10-CM

## 2023-07-12 DIAGNOSIS — Z00.00 WELLNESS EXAMINATION: ICD-10-CM

## 2023-07-12 DIAGNOSIS — E11.9 TYPE 2 DIABETES MELLITUS WITHOUT COMPLICATION, WITHOUT LONG-TERM CURRENT USE OF INSULIN: ICD-10-CM

## 2023-07-12 DIAGNOSIS — Z12.5 PROSTATE CANCER SCREENING: ICD-10-CM

## 2023-07-12 DIAGNOSIS — F20.9 SCHIZOPHRENIA, UNSPECIFIED TYPE: ICD-10-CM

## 2023-07-12 PROCEDURE — 3061F PR NEG MICROALBUMINURIA RESULT DOCUMENTED/REVIEW: ICD-10-PCS | Mod: CPTII,,, | Performed by: INTERNAL MEDICINE

## 2023-07-12 PROCEDURE — 99214 OFFICE O/P EST MOD 30 MIN: CPT | Mod: ,,, | Performed by: INTERNAL MEDICINE

## 2023-07-12 PROCEDURE — 3078F PR MOST RECENT DIASTOLIC BLOOD PRESSURE < 80 MM HG: ICD-10-PCS | Mod: CPTII,,, | Performed by: INTERNAL MEDICINE

## 2023-07-12 PROCEDURE — 3044F PR MOST RECENT HEMOGLOBIN A1C LEVEL <7.0%: ICD-10-PCS | Mod: CPTII,,, | Performed by: INTERNAL MEDICINE

## 2023-07-12 PROCEDURE — 3078F DIAST BP <80 MM HG: CPT | Mod: CPTII,,, | Performed by: INTERNAL MEDICINE

## 2023-07-12 PROCEDURE — 3008F BODY MASS INDEX DOCD: CPT | Mod: CPTII,,, | Performed by: INTERNAL MEDICINE

## 2023-07-12 PROCEDURE — 99214 PR OFFICE/OUTPT VISIT, EST, LEVL IV, 30-39 MIN: ICD-10-PCS | Mod: ,,, | Performed by: INTERNAL MEDICINE

## 2023-07-12 PROCEDURE — 3044F HG A1C LEVEL LT 7.0%: CPT | Mod: CPTII,,, | Performed by: INTERNAL MEDICINE

## 2023-07-12 PROCEDURE — 3061F NEG MICROALBUMINURIA REV: CPT | Mod: CPTII,,, | Performed by: INTERNAL MEDICINE

## 2023-07-12 PROCEDURE — 3008F PR BODY MASS INDEX (BMI) DOCUMENTED: ICD-10-PCS | Mod: CPTII,,, | Performed by: INTERNAL MEDICINE

## 2023-07-12 PROCEDURE — 3066F PR DOCUMENTATION OF TREATMENT FOR NEPHROPATHY: ICD-10-PCS | Mod: CPTII,,, | Performed by: INTERNAL MEDICINE

## 2023-07-12 PROCEDURE — 1160F RVW MEDS BY RX/DR IN RCRD: CPT | Mod: CPTII,,, | Performed by: INTERNAL MEDICINE

## 2023-07-12 PROCEDURE — 1159F PR MEDICATION LIST DOCUMENTED IN MEDICAL RECORD: ICD-10-PCS | Mod: CPTII,,, | Performed by: INTERNAL MEDICINE

## 2023-07-12 PROCEDURE — 3074F SYST BP LT 130 MM HG: CPT | Mod: CPTII,,, | Performed by: INTERNAL MEDICINE

## 2023-07-12 PROCEDURE — 1159F MED LIST DOCD IN RCRD: CPT | Mod: CPTII,,, | Performed by: INTERNAL MEDICINE

## 2023-07-12 PROCEDURE — 3074F PR MOST RECENT SYSTOLIC BLOOD PRESSURE < 130 MM HG: ICD-10-PCS | Mod: CPTII,,, | Performed by: INTERNAL MEDICINE

## 2023-07-12 PROCEDURE — 1160F PR REVIEW ALL MEDS BY PRESCRIBER/CLIN PHARMACIST DOCUMENTED: ICD-10-PCS | Mod: CPTII,,, | Performed by: INTERNAL MEDICINE

## 2023-07-12 PROCEDURE — 3066F NEPHROPATHY DOC TX: CPT | Mod: CPTII,,, | Performed by: INTERNAL MEDICINE

## 2023-07-12 RX ORDER — ALPRAZOLAM 1 MG/1
1 TABLET ORAL NIGHTLY
Qty: 30 TABLET | Refills: 3 | Status: SHIPPED | OUTPATIENT
Start: 2023-07-12 | End: 2023-11-13

## 2023-07-12 NOTE — TELEPHONE ENCOUNTER
----- Message from Gilda Leiva sent at 7/12/2023 10:35 AM CDT -----  Regarding: Pharmacy Call  Type:  Pharmacy Calling to Clarify an RX    Name of Caller:Onesimo  Pharmacy Name:[facility] on [address]  Prescription Name: alprazogam  What do they need to clarify?:Patient already taking medication at 2 mg from another Dr Chandler Call Back Number:[phone]  Additional Information:

## 2023-08-15 ENCOUNTER — PATIENT MESSAGE (OUTPATIENT)
Dept: ADMINISTRATIVE | Facility: OTHER | Age: 58
End: 2023-08-15
Payer: MEDICARE

## 2023-11-09 ENCOUNTER — HOSPITAL ENCOUNTER (OUTPATIENT)
Facility: HOSPITAL | Age: 58
Discharge: HOME | End: 2023-11-10
Attending: EMERGENCY MEDICINE | Admitting: INTERNAL MEDICINE
Payer: MEDICARE

## 2023-11-09 DIAGNOSIS — R06.02 SHORTNESS OF BREATH: ICD-10-CM

## 2023-11-09 DIAGNOSIS — R07.9 CHEST PAIN: ICD-10-CM

## 2023-11-09 DIAGNOSIS — R07.9 ACUTE CHEST PAIN: Primary | ICD-10-CM

## 2023-11-09 LAB
ALBUMIN SERPL-MCNC: 4.5 G/DL (ref 3.4–4.8)
ALBUMIN/GLOB SERPL: 1.7 RATIO (ref 1.1–2)
ALP SERPL-CCNC: 46 UNIT/L (ref 40–150)
ALT SERPL-CCNC: 22 UNIT/L (ref 0–55)
AST SERPL-CCNC: 26 UNIT/L (ref 5–34)
BASOPHILS # BLD AUTO: 0.09 X10(3)/MCL
BASOPHILS NFR BLD AUTO: 0.9 %
BILIRUB SERPL-MCNC: 0.4 MG/DL
BNP BLD-MCNC: <10 PG/ML
BUN SERPL-MCNC: 23.3 MG/DL (ref 8.4–25.7)
CALCIUM SERPL-MCNC: 10 MG/DL (ref 8.8–10)
CHLORIDE SERPL-SCNC: 104 MMOL/L (ref 98–107)
CO2 SERPL-SCNC: 28 MMOL/L (ref 23–31)
CREAT SERPL-MCNC: 1.45 MG/DL (ref 0.73–1.18)
EOSINOPHIL # BLD AUTO: 0.25 X10(3)/MCL (ref 0–0.9)
EOSINOPHIL NFR BLD AUTO: 2.5 %
ERYTHROCYTE [DISTWIDTH] IN BLOOD BY AUTOMATED COUNT: 14.7 % (ref 11.5–17)
GFR SERPLBLD CREATININE-BSD FMLA CKD-EPI: 55 MLS/MIN/1.73/M2
GLOBULIN SER-MCNC: 2.7 GM/DL (ref 2.4–3.5)
GLUCOSE SERPL-MCNC: 86 MG/DL (ref 82–115)
HCT VFR BLD AUTO: 41.7 % (ref 42–52)
HGB BLD-MCNC: 13.8 G/DL (ref 14–18)
IMM GRANULOCYTES # BLD AUTO: 0.04 X10(3)/MCL (ref 0–0.04)
IMM GRANULOCYTES NFR BLD AUTO: 0.4 %
LYMPHOCYTES # BLD AUTO: 3.35 X10(3)/MCL (ref 0.6–4.6)
LYMPHOCYTES NFR BLD AUTO: 33.3 %
MCH RBC QN AUTO: 31.7 PG (ref 27–31)
MCHC RBC AUTO-ENTMCNC: 33.1 G/DL (ref 33–36)
MCV RBC AUTO: 95.6 FL (ref 80–94)
MONOCYTES # BLD AUTO: 0.92 X10(3)/MCL (ref 0.1–1.3)
MONOCYTES NFR BLD AUTO: 9.1 %
NEUTROPHILS # BLD AUTO: 5.41 X10(3)/MCL (ref 2.1–9.2)
NEUTROPHILS NFR BLD AUTO: 53.8 %
NRBC BLD AUTO-RTO: 0 %
PLATELET # BLD AUTO: 212 X10(3)/MCL (ref 130–400)
PMV BLD AUTO: 11.5 FL (ref 7.4–10.4)
POTASSIUM SERPL-SCNC: 4 MMOL/L (ref 3.5–5.1)
PROT SERPL-MCNC: 7.2 GM/DL (ref 5.8–7.6)
RBC # BLD AUTO: 4.36 X10(6)/MCL (ref 4.7–6.1)
SODIUM SERPL-SCNC: 139 MMOL/L (ref 136–145)
TROPONIN I SERPL-MCNC: <0.01 NG/ML (ref 0–0.04)
WBC # SPEC AUTO: 10.06 X10(3)/MCL (ref 4.5–11.5)

## 2023-11-09 PROCEDURE — 96375 TX/PRO/DX INJ NEW DRUG ADDON: CPT

## 2023-11-09 PROCEDURE — 25000003 PHARM REV CODE 250: Performed by: EMERGENCY MEDICINE

## 2023-11-09 PROCEDURE — G0378 HOSPITAL OBSERVATION PER HR: HCPCS

## 2023-11-09 PROCEDURE — 83880 ASSAY OF NATRIURETIC PEPTIDE: CPT

## 2023-11-09 PROCEDURE — 99285 EMERGENCY DEPT VISIT HI MDM: CPT | Mod: 25

## 2023-11-09 PROCEDURE — 80053 COMPREHEN METABOLIC PANEL: CPT

## 2023-11-09 PROCEDURE — 63600175 PHARM REV CODE 636 W HCPCS: Performed by: EMERGENCY MEDICINE

## 2023-11-09 PROCEDURE — 93005 ELECTROCARDIOGRAM TRACING: CPT

## 2023-11-09 PROCEDURE — 96374 THER/PROPH/DIAG INJ IV PUSH: CPT

## 2023-11-09 PROCEDURE — 85025 COMPLETE CBC W/AUTO DIFF WBC: CPT

## 2023-11-09 PROCEDURE — 84484 ASSAY OF TROPONIN QUANT: CPT

## 2023-11-09 RX ORDER — MORPHINE SULFATE 4 MG/ML
4 INJECTION, SOLUTION INTRAMUSCULAR; INTRAVENOUS
Status: COMPLETED | OUTPATIENT
Start: 2023-11-09 | End: 2023-11-09

## 2023-11-09 RX ORDER — ONDANSETRON 2 MG/ML
4 INJECTION INTRAMUSCULAR; INTRAVENOUS
Status: COMPLETED | OUTPATIENT
Start: 2023-11-09 | End: 2023-11-09

## 2023-11-09 RX ORDER — NAPROXEN SODIUM 220 MG/1
324 TABLET, FILM COATED ORAL
Status: COMPLETED | OUTPATIENT
Start: 2023-11-09 | End: 2023-11-09

## 2023-11-09 RX ADMIN — ASPIRIN 81 MG CHEWABLE TABLET 324 MG: 81 TABLET CHEWABLE at 10:11

## 2023-11-09 RX ADMIN — MORPHINE SULFATE 4 MG: 4 INJECTION, SOLUTION INTRAMUSCULAR; INTRAVENOUS at 10:11

## 2023-11-09 RX ADMIN — ONDANSETRON 4 MG: 2 INJECTION INTRAMUSCULAR; INTRAVENOUS at 10:11

## 2023-11-10 VITALS
TEMPERATURE: 98 F | RESPIRATION RATE: 18 BRPM | HEIGHT: 70 IN | BODY MASS INDEX: 29.67 KG/M2 | WEIGHT: 207.25 LBS | HEART RATE: 62 BPM | DIASTOLIC BLOOD PRESSURE: 71 MMHG | SYSTOLIC BLOOD PRESSURE: 110 MMHG | OXYGEN SATURATION: 97 %

## 2023-11-10 PROBLEM — R07.9 ACUTE CHEST PAIN: Status: ACTIVE | Noted: 2023-11-10

## 2023-11-10 LAB
AV INDEX (PROSTH): 0.82
AV MEAN GRADIENT: 3 MMHG
AV PEAK GRADIENT: 6 MMHG
AV VALVE AREA BY VELOCITY RATIO: 3.16 CM²
AV VALVE AREA: 3.13 CM²
AV VELOCITY RATIO: 0.83
BSA FOR ECHO PROCEDURE: 2.15 M2
CV ECHO LV RWT: 0.55 CM
DOP CALC AO PEAK VEL: 1.19 M/S
DOP CALC AO VTI: 25.6 CM
DOP CALC LVOT AREA: 3.8 CM2
DOP CALC LVOT DIAMETER: 2.2 CM
DOP CALC LVOT PEAK VEL: 0.99 M/S
DOP CALC LVOT STROKE VOLUME: 80.17 CM3
DOP CALC MV VTI: 33 CM
DOP CALCLVOT PEAK VEL VTI: 21.1 CM
E WAVE DECELERATION TIME: 211 MSEC
E/A RATIO: 1.39
E/E' RATIO: 10.33 M/S
ECHO LV POSTERIOR WALL: 1.2 CM (ref 0.6–1.1)
FRACTIONAL SHORTENING: 30 % (ref 28–44)
INTERVENTRICULAR SEPTUM: 1.2 CM (ref 0.6–1.1)
LEFT ATRIUM SIZE: 3.8 CM
LEFT ATRIUM VOLUME INDEX MOD: 22 ML/M2
LEFT ATRIUM VOLUME MOD: 46.6 CM3
LEFT INTERNAL DIMENSION IN SYSTOLE: 3.1 CM (ref 2.1–4)
LEFT VENTRICLE DIASTOLIC VOLUME INDEX: 41.37 ML/M2
LEFT VENTRICLE DIASTOLIC VOLUME: 87.7 ML
LEFT VENTRICLE MASS INDEX: 90 G/M2
LEFT VENTRICLE SYSTOLIC VOLUME INDEX: 17.9 ML/M2
LEFT VENTRICLE SYSTOLIC VOLUME: 37.9 ML
LEFT VENTRICULAR INTERNAL DIMENSION IN DIASTOLE: 4.4 CM (ref 3.5–6)
LEFT VENTRICULAR MASS: 191.33 G
LV LATERAL E/E' RATIO: 9.3 M/S
LV SEPTAL E/E' RATIO: 11.63 M/S
LVOT MG: 2 MMHG
LVOT MV: 0.63 CM/S
MV MEAN GRADIENT: 2 MMHG
MV PEAK A VEL: 0.67 M/S
MV PEAK E VEL: 0.93 M/S
MV PEAK GRADIENT: 4 MMHG
MV STENOSIS PRESSURE HALF TIME: 55 MS
MV VALVE AREA BY CONTINUITY EQUATION: 2.43 CM2
MV VALVE AREA P 1/2 METHOD: 4 CM2
OHS LV EJECTION FRACTION SIMPSONS BIPLANE MOD: 68 %
PISA TR MAX VEL: 1.58 M/S
PV PEAK GRADIENT: 2 MMHG
PV PEAK VELOCITY: 0.71 M/S
RIGHT VENTRICULAR END-DIASTOLIC DIMENSION: 2.7 CM
TDI LATERAL: 0.1 M/S
TDI SEPTAL: 0.08 M/S
TDI: 0.09 M/S
TR MAX PG: 10 MMHG
TRICUSPID ANNULAR PLANE SYSTOLIC EXCURSION: 2.52 CM
TROPONIN I SERPL-MCNC: <0.01 NG/ML (ref 0–0.04)
Z-SCORE OF LEFT VENTRICULAR DIMENSION IN END DIASTOLE: -4.2
Z-SCORE OF LEFT VENTRICULAR DIMENSION IN END SYSTOLE: -2.19

## 2023-11-10 PROCEDURE — 27000221 HC OXYGEN, UP TO 24 HOURS

## 2023-11-10 PROCEDURE — 94761 N-INVAS EAR/PLS OXIMETRY MLT: CPT

## 2023-11-10 PROCEDURE — 25000003 PHARM REV CODE 250: Performed by: NURSE PRACTITIONER

## 2023-11-10 PROCEDURE — 84484 ASSAY OF TROPONIN QUANT: CPT | Mod: 91 | Performed by: EMERGENCY MEDICINE

## 2023-11-10 PROCEDURE — G0378 HOSPITAL OBSERVATION PER HR: HCPCS

## 2023-11-10 PROCEDURE — 96376 TX/PRO/DX INJ SAME DRUG ADON: CPT

## 2023-11-10 PROCEDURE — 63600175 PHARM REV CODE 636 W HCPCS: Performed by: EMERGENCY MEDICINE

## 2023-11-10 PROCEDURE — 93005 ELECTROCARDIOGRAM TRACING: CPT

## 2023-11-10 RX ORDER — ASPIRIN 81 MG/1
81 TABLET ORAL DAILY
Status: DISCONTINUED | OUTPATIENT
Start: 2023-11-10 | End: 2023-11-10 | Stop reason: HOSPADM

## 2023-11-10 RX ORDER — MORPHINE SULFATE 4 MG/ML
4 INJECTION, SOLUTION INTRAMUSCULAR; INTRAVENOUS EVERY 4 HOURS PRN
Status: DISCONTINUED | OUTPATIENT
Start: 2023-11-10 | End: 2023-11-10 | Stop reason: HOSPADM

## 2023-11-10 RX ORDER — ATORVASTATIN CALCIUM 40 MG/1
40 TABLET, FILM COATED ORAL NIGHTLY
Status: DISCONTINUED | OUTPATIENT
Start: 2023-11-10 | End: 2023-11-10 | Stop reason: HOSPADM

## 2023-11-10 RX ORDER — LABETALOL HYDROCHLORIDE 5 MG/ML
10 INJECTION, SOLUTION INTRAVENOUS EVERY 4 HOURS PRN
Status: DISCONTINUED | OUTPATIENT
Start: 2023-11-10 | End: 2023-11-10 | Stop reason: HOSPADM

## 2023-11-10 RX ORDER — MINERAL OIL
180 ENEMA (ML) RECTAL DAILY
COMMUNITY

## 2023-11-10 RX ORDER — ASPIRIN 81 MG/1
81 TABLET ORAL DAILY
Qty: 30 TABLET | Refills: 0 | Status: SHIPPED | OUTPATIENT
Start: 2023-11-11 | End: 2024-11-10

## 2023-11-10 RX ORDER — FENOFIBRATE 145 MG/1
145 TABLET, FILM COATED ORAL DAILY
Status: DISCONTINUED | OUTPATIENT
Start: 2023-11-10 | End: 2023-11-10 | Stop reason: HOSPADM

## 2023-11-10 RX ADMIN — MORPHINE SULFATE 4 MG: 4 INJECTION, SOLUTION INTRAMUSCULAR; INTRAVENOUS at 12:11

## 2023-11-10 RX ADMIN — MORPHINE SULFATE 4 MG: 4 INJECTION, SOLUTION INTRAMUSCULAR; INTRAVENOUS at 07:11

## 2023-11-10 RX ADMIN — ASPIRIN 81 MG: 81 TABLET, COATED ORAL at 11:11

## 2023-11-10 RX ADMIN — MORPHINE SULFATE 4 MG: 4 INJECTION, SOLUTION INTRAMUSCULAR; INTRAVENOUS at 02:11

## 2023-11-10 NOTE — NURSING
Nurses Note -- 4 Eyes      11/10/2023   1:05 AM      Skin assessed during: Admit      [x] No Altered Skin Integrity Present    []Prevention Measures Documented      [] Yes- Altered Skin Integrity Present or Discovered   [] LDA Added if Not in Epic (Describe Wound)   [] New Altered Skin Integrity was Present on Admit and Documented in LDA   [] Wound Image Taken    Wound Care Consulted? No    Attending Nurse:  Ally Poe RN/Staff Member:   SHAWN Knox

## 2023-11-10 NOTE — ED PROVIDER NOTES
"Encounter Date: 11/9/2023       History     Chief Complaint   Patient presents with    Chest Pain     SOB with Left sided chest pain that feels like chest pressure that started 2 hours ago but has been present for a couple months, recommended by  to present to the ED. No hx of stents. Having nausea.      58-year-old man with schizoaffective bipolar type,  hyperlipidemia, diabetes, GERD, anxiety presents emergency department with left-sided chest pain radiating down his left arm associated with nausea and shortness of breath.  He states he has been getting his episodes about 2 to 3 times a week for last few months however more intense this week and particularly worse today states today was the 1st time it scared him.  He talked to his cardiologist who recommended he come to the emergency department for evaluation.  States he is still having some pain but "I am fine now" states he can not take nitroglycerin because he is allergic to it.  He has not had any aspirin today.  States he is never had a stress test that he can not tolerate the treadmill test due to hip pain from previous surgery.  States he was claustrophobic so he was worried about doing a nuclear stress test so he has not had that either.  Never had an angiogram        Review of patient's allergies indicates:   Allergen Reactions    Nitroglycerin      Past Medical History:   Diagnosis Date    Personal history of colonic polyps 04/30/2014    Dr. No Soliman     Past Surgical History:   Procedure Laterality Date    CHOLECYSTECTOMY      COLONOSCOPY  04/30/2014    Dr. No Soliman    COLONOSCOPY  06/02/2017    Dr. Jayro Garvin    NASAL SINUS SURGERY      ROTATOR CUFF REPAIR      TONSILLECTOMY      TYMPANOSTOMY TUBE PLACEMENT       Family History   Problem Relation Age of Onset    Depression Mother     Heart disease Mother     Hyperlipidemia Mother     Arthritis Mother     Asthma Mother     COPD Mother     Diabetes Mother     Depression Father     " Heart disease Father     Hyperlipidemia Father     Cancer Father     Hypertension Father     Kidney disease Father     Mental illness Father     Depression Sister     Drug abuse Sister     Hearing loss Sister     Depression Brother     Drug abuse Brother     Diabetes Maternal Grandmother     Depression Maternal Grandfather     Dementia Maternal Grandfather     Stroke Maternal Grandfather     Depression Paternal Grandmother     Depression Paternal Grandfather      Social History     Tobacco Use    Smoking status: Former     Current packs/day: 0.25     Average packs/day: 0.3 packs/day for 15.0 years (3.8 ttl pk-yrs)     Types: Cigarettes    Smokeless tobacco: Never   Substance Use Topics    Alcohol use: Never    Drug use: Never     Review of Systems   Constitutional:  Negative for chills and fever.   Respiratory:  Positive for shortness of breath. Negative for cough and chest tightness.    Cardiovascular:  Positive for chest pain.   Gastrointestinal:  Positive for nausea. Negative for abdominal pain and vomiting.   Musculoskeletal:  Negative for myalgias and neck pain.   Neurological:  Negative for syncope.   All other systems reviewed and are negative.      Physical Exam     Initial Vitals [11/09/23 1814]   BP Pulse Resp Temp SpO2   127/84 83 20 98.4 °F (36.9 °C) 98 %      MAP       --         Physical Exam    Nursing note and vitals reviewed.  Constitutional: He appears well-developed and well-nourished. No distress.   HENT:   Head: Normocephalic and atraumatic.   Eyes: Conjunctivae are normal.   Cardiovascular:  Normal rate.           Pulmonary/Chest: No respiratory distress. He has no wheezes. He has no rhonchi.   Abdominal: Abdomen is soft. There is no abdominal tenderness. There is no rebound and no guarding.   Musculoskeletal:         General: Normal range of motion.     Neurological: He is alert and oriented to person, place, and time. He has normal strength.   Skin: Skin is warm and dry.   Psychiatric: He has  a normal mood and affect.   Slightly anxious but calm and cooperative in no distress         ED Course   Procedures  Labs Reviewed   COMPREHENSIVE METABOLIC PANEL - Abnormal; Notable for the following components:       Result Value    Creatinine 1.45 (*)     All other components within normal limits   CBC WITH DIFFERENTIAL - Abnormal; Notable for the following components:    RBC 4.36 (*)     Hgb 13.8 (*)     Hct 41.7 (*)     MCV 95.6 (*)     MCH 31.7 (*)     MPV 11.5 (*)     All other components within normal limits   B-TYPE NATRIURETIC PEPTIDE - Normal   TROPONIN I - Normal   CBC W/ AUTO DIFFERENTIAL    Narrative:     The following orders were created for panel order CBC Auto Differential.  Procedure                               Abnormality         Status                     ---------                               -----------         ------                     CBC with Differential[[id number]]        Abnormal            Final result                 Please view results for these tests on the individual orders.          Imaging Results              X-Ray Chest 1 View (Final result)  Result time 11/09/23 18:29:30      Final result by Ashley Osborne MD (11/09/23 18:29:30)                   Impression:      No abnormality seen      Electronically signed by: Ashley Osborne  Date:    11/09/2023  Time:    18:29               Narrative:    EXAMINATION:  XR CHEST 1 VIEW    CLINICAL HISTORY:  shortness of breath;    TECHNIQUE:  Single frontal view of the chest was performed.    COMPARISON:  07/08/2016    FINDINGS:  The lungs are clear.  The heart is normal appearance.  The pulmonary vascularity is unremarkable.  Aorta appears grossly unremarkable.  No pleural effusions are seen.  Bones and joints show no acute abnormality.                                       Medications   aspirin chewable tablet 324 mg (has no administration in time range)   morphine injection 4 mg (has no administration in time range)    ondansetron injection 4 mg (has no administration in time range)     Medical Decision Making  Typical chest pain fluctuating over last few months worse today.  EKG is reassuring troponin is negative.  [facility] called for their opinion further cardiac evaluation    Problems Addressed:  Acute chest pain: acute illness or injury    Amount and/or Complexity of Data Reviewed  Labs: ordered. Decision-making details documented in ED Course.  Radiology: ordered and independent interpretation performed. Decision-making details documented in ED Course.  ECG/medicine tests: ordered and independent interpretation performed.    Risk  OTC drugs.  Prescription drug management.               ED Course as of 11/09/23 2243   Thu Nov 09, 2023 2119 EKG at 1810.  83 beats per minute sinus rhythm no ST elevation or depression  [LF]   2240 Discussed with Srinivas with [facility].  They will admit for cardiac rule out [LF]      ED Course User Index  [LF] Abhijeet Reyes MD                    Clinical Impression:   Final diagnoses:  [R06.02] Shortness of breath  [R07.9] Acute chest pain (Primary)        ED Disposition Condition    Observation Stable                Abhijeet Reyes MD  11/09/23 2246

## 2023-11-10 NOTE — H&P
" [facility] - [facility] Tele[facility]    Cardiology  History and Physical     Patient Name: Sharan Cadena  MRN: [id number]  Admission Date: 11/9/2023  Code Status: No Order   Attending Provider: Patrick Weller MD   Primary Care Physician: Hamilton Tyler II, MD  Principal Problem:<principal problem not specified>    Patient information was obtained from patient, past medical records, and ER records.     Subjective:     Chief Complaint:  Chest Pain     HPI: 58-year-old male known to [facility]/Dr. Weller with PMHx of palpitations, HLD, DM type II, SHARON, schizophrenia, bipolar, and tobacco use. He presented to the ED with complaints of chest pain that began around 1800 on 11.9.23. He describes his pain as a chest heaviness that was localized to his left chest wall with radiation down his left arm. He also reports associated nausea and shortness of breath. He reports he has had these episodes about 2 to 3 times a week for the last few months or so. However, the occurrence that began yesterday afternoon was much worse in intensity. He spoke to his cardiologist who recommended presenting to the ED for further evaluation. He current reported he still has mild pain on presentation  but reports "I am fine now" He reports he is allergic to nitro and is unable to take. He denies any previous stress test due to hip pain and being claustrophobic. He has also never had an angiogram. VS on admit: HR 83, /84, RR 20, O2 sat 98%. Lab work: WBC 10.06, H/H 13.8/41.7, , Na 139, K 4.0, BUN/Cr 23.3/1.45, Ca 10, AST/ALT 26/22, BNP <10.0, negative trop x3, EKG shows no acute ST changes. CXR shows no acute findings. [facility] will admit for observation for chest pain.       PMH: Angina, Palpitations, Venous insufficiency, HLD, DM type II, SHARON, tobacco use, GERD, Bipolar, colon polyps, schizophrenia    PSH: cholecystectomy, colonoscopy, nasal sinus surgery, rotator cuff repair, tonsillectomy, tympanostomy tube placement "   Family History: Father: CAD s/p CABG; Mother: CAD s/p CABG, asthma, COPD; Brother CAD, drug abuse; Sister: drug abuse  Social History: current tobacco use, denies ETOH or illict drug use     Previous Cardiac Diagnostics:   ECHO 11.10.23 ~ Pending     ECHO 5.9.23:  The study quality is average.   The left ventricle is normal in size. Global left ventricular systolic function is normal. The left ventricular ejection fraction is 65%. Left ventricular diastolic function is normal.  Trace mitral regurgitation. Trace tricuspid regurgitation.   The estimated pulmonary artery systolic pressure is 18 mmHg assuming a right atrial pressure of 3 mmHg.    Carotid US 5.9.23  The study quality is average.   1-39% stenosis in the proximal right internal carotid artery based on Bluth Criteria.   1-39% stenosis in the proximal left internal carotid artery based on Bluth Criteria.   Antegrade right vertebral artery flow.   Antegrade left vertebral artery flow.     ECHO 6.18.22  The study quality is average.   Global left ventricular systolic function is normal. The left ventricular ejection fraction is 65%. Left ventricular diastolic function is normal.  Trace mitral and tricuspid  regurgitation. Mild calcification of the mitral valve is noted.   The pulmonary artery appears normal.     Holter Monitor 10.2.20  This is an excellent quality study.   Predominant rhythm is normal sinus rhythm.   The minimum heart rate recorded was 67 beats / minute (10/2/2020). The maximum heart rate is 127 beats / minute (10/2/2020). The mean heart rate is 87 beats / minute.   No evidence of AV block is noted.   Rare premature atrial contractions noted.   No evidence of supraventricular tachycardia is noted.  No evidence of ventricular tachycardia is noted.  No evidence of supraventricular arrhythmia is noted.  No evidence of ventricular arrhythmia is noted.  No pauses were noted.   NO SYMPTOMS         Review of patient's allergies indicates:    Allergen Reactions    Nitroglycerin        No current facility-administered medications on file prior to encounter.     Current Outpatient Medications on File Prior to Encounter   Medication Sig    atorvastatin (LIPITOR) 40 MG tablet Take 40 mg by mouth every evening.    benztropine (COGENTIN) 2 MG Tab Take 2 mg by mouth 3 (three) times daily.    fenofibrate (TRICOR) 145 MG tablet Take 145 mg by mouth once daily.    fexofenadine (ALLEGRA) 180 MG tablet Take 180 mg by mouth once daily.    OXcarbazepine (TRILEPTAL) 150 MG Tab Take 150 mg by mouth 2 (two) times a day.    QUEtiapine (SEROQUEL XR) 300 MG Tb24 Take 2 tablets by mouth every evening.    ALPRAZolam (XANAX) 1 MG tablet Take 1 tablet (1 mg total) by mouth every evening.    flunisolide 25 mcg, 0.025%, (NASALIDE) 25 mcg (0.025 %) Spry 2 sprays by Nasal route 2 (two) times daily.    linaCLOtide (LINZESS) 290 mcg Cap capsule Take 1 capsule by mouth once daily    OLANZapine (ZYPREXA) 15 MG Tab Take 1 tablet by mouth Daily.    pantoprazole (PROTONIX) 40 MG tablet Take 40 mg by mouth once daily.    tamsulosin (FLOMAX) 0.4 mg Cap Take 1 capsule (0.4 mg total) by mouth once daily.    traMADoL (ULTRAM) 50 mg tablet Take 1 tablet (50 mg total) by mouth every 6 (six) hours.           Review of Systems   Constitutional: Negative for chills and fever.   Cardiovascular:  Negative for chest pain, palpitations and syncope.   Respiratory:  Negative for cough and shortness of breath.    Gastrointestinal:  Negative for abdominal pain, nausea and vomiting.   Genitourinary: Negative.    Psychiatric/Behavioral: Negative.       Objective:     Vital Signs (Most Recent):  Temp: 97.6 °F (36.4 °C) (11/10/23 0809)  Pulse: 63 (11/10/23 0809)  Resp: 20 (11/10/23 0809)  BP: 109/70 (11/10/23 0809)  SpO2: 98 % (11/10/23 0809) Vital Signs (24h Range):  Temp:  [97.4 °F (36.3 °C)-98.4 °F (36.9 °C)] 97.6 °F (36.4 °C)  Pulse:  [63-83] 63  Resp:  [15-20] 20  SpO2:  [96 %-99 %] 98 %  BP:  "(107-130)/(70-85) 109/70     Weight: 94 kg (207 lb 3.7 oz)  Body mass index is 29.73 kg/m².    SpO2: 98 %       No intake or output data in the 24 hours ending 11/10/23 0951    Lines/Drains/Airways       Peripheral Intravenous Line  Duration                  Peripheral IV - Single Lumen 11/09/23 2241 20 G Left Forearm <1 day                    Physical Exam  Constitutional:       Appearance: Normal appearance.   HENT:      Nose: Nose normal.      Mouth/Throat:      Mouth: Mucous membranes are moist.   Cardiovascular:      Rate and Rhythm: Normal rate and regular rhythm.      Pulses: Normal pulses.      Heart sounds: Normal heart sounds.   Pulmonary:      Effort: Pulmonary effort is normal.      Breath sounds: Normal breath sounds.   Abdominal:      General: Abdomen is flat.      Palpations: Abdomen is soft.   Neurological:      Mental Status: He is alert and oriented to person, place, and time.   Psychiatric:         Mood and Affect: Mood normal.         Behavior: Behavior normal.         Judgment: Judgment normal.         EKG:       Telemetry: NSR    Significant Labs: ABG: No results for input(s): "PH", "PCO2", "HCO3", "POCSATURATED", "BE" in the last 48 hours., BMP:   Recent Labs   Lab 11/09/23  1825      K 4.0   CO2 28   BUN 23.3   CREATININE 1.45*   CALCIUM 10.0   , CMP   Recent Labs   Lab 11/09/23  1825      K 4.0   CO2 28   BUN 23.3   CREATININE 1.45*   CALCIUM 10.0   ALBUMIN 4.5   BILITOT 0.4   ALKPHOS 46   AST 26   ALT 22   , CBC   Recent Labs   Lab 11/09/23  1825   WBC 10.06   HGB 13.8*   HCT 41.7*      , INR No results for input(s): "INR", "PROTIME" in the last 48 hours., and Troponin   Recent Labs   Lab 11/09/23  1825 11/10/23  0053 11/10/23  0638   TROPONINI <0.010 <0.010 <0.010       Significant Imaging:   Results for orders placed or performed during the hospital encounter of 11/09/23   X-Ray Chest 1 View    Narrative    EXAMINATION:  XR CHEST 1 VIEW    CLINICAL HISTORY:  shortness " of breath;    TECHNIQUE:  Single frontal view of the chest was performed.    COMPARISON:  07/08/2016    FINDINGS:  The lungs are clear.  The heart is normal appearance.  The pulmonary vascularity is unremarkable.  Aorta appears grossly unremarkable.  No pleural effusions are seen.  Bones and joints show no acute abnormality.      Impression    No abnormality seen      Electronically signed by: Ashley Osborne  Date:    11/09/2023  Time:    18:29      Assessment and Plan:   Chest Pain   - Trop negative x3 <0.010   - EKG shows no acute changes   - no previous stress testing or LHC    Palpitations    - SR on tele review & EKG  HLD  DM Type II  SHARON  Tobacco use  Bipolar  Schizophrenia   Venous insufficiency        Plan:  EKG reviewed  Repeat ECHO pending   Restart home Lipitor & Fenofibrate  Start ASA  Okay to follow up in clinic consider Ca score as an outapatient  Will discharge home         VTE Risk Mitigation (From admission, onward)      None            Na Carvajal NP  Cardiology  [facility] - [facility] Tele[facility]  11/10/2023 9:51 AM      I have seen the patient, reviewed the Nurse Practitioner's note, assessment and plan. I have personally interviewed and examined the patient at bedside and agree with the findings. Medical decision making listed above were done under my guidance.

## 2023-11-10 NOTE — H&P
"  [facility] - [facility] Medical Telemetry  Short Stay Discharge Summary    Cardiology    Chief Complaint:  Chest Pain      HPI: 58-year-old male known to [facility]/Dr. Weller with PMHx of palpitations, HLD, DM type II, SHARON, schizophrenia, bipolar, and tobacco use. He presented to the ED with complaints of chest pain that began around 1800 on 11.9.23. He describes his pain as a chest heaviness that was localized to his left chest wall with radiation down his left arm. He also reports associated nausea and shortness of breath. He reports he has had these episodes about 2 to 3 times a week for the last few months or so. However, the occurrence that began yesterday afternoon was much worse in intensity. He spoke to his cardiologist who recommended presenting to the ED for further evaluation. He current reported he still has mild pain on presentation  but reports "I am fine now" He reports he is allergic to nitro and is unable to take. He denies any previous stress test due to hip pain and being claustrophobic. He has also never had an angiogram. VS on admit: HR 83, /84, RR 20, O2 sat 98%. Lab work: WBC 10.06, H/H 13.8/41.7, , Na 139, K 4.0, BUN/Cr 23.3/1.45, Ca 10, AST/ALT 26/22, BNP <10.0, negative trop x3, EKG shows no acute ST changes. CXR shows no acute findings. [facility] will admit for observation for chest pain.         PMH: Angina, Palpitations, Venous insufficiency, HLD, DM type II, SHARON, tobacco use, GERD, Bipolar, colon polyps, schizophrenia    PSH: cholecystectomy, colonoscopy, nasal sinus surgery, rotator cuff repair, tonsillectomy, tympanostomy tube placement   Family History: Father: CAD s/p CABG; Mother: CAD s/p CABG, asthma, COPD; Brother CAD, drug abuse; Sister: drug abuse  Social History: current tobacco use, denies ETOH or illict drug use      Previous Cardiac Diagnostics:   ECHO 11.10.23 ~ Pending      ECHO 5.9.23:  The study quality is average.   The left ventricle is normal in size. " Global left ventricular systolic function is normal. The left ventricular ejection fraction is 65%. Left ventricular diastolic function is normal.  Trace mitral regurgitation. Trace tricuspid regurgitation.   The estimated pulmonary artery systolic pressure is 18 mmHg assuming a right atrial pressure of 3 mmHg.     Carotid US 5.9.23  The study quality is average.   1-39% stenosis in the proximal right internal carotid artery based on Bluth Criteria.   1-39% stenosis in the proximal left internal carotid artery based on Bluth Criteria.   Antegrade right vertebral artery flow.   Antegrade left vertebral artery flow.      ECHO 6.18.22  The study quality is average.   Global left ventricular systolic function is normal. The left ventricular ejection fraction is 65%. Left ventricular diastolic function is normal.  Trace mitral and tricuspid  regurgitation. Mild calcification of the mitral valve is noted.   The pulmonary artery appears normal.      Holter Monitor 10.2.20  This is an excellent quality study.   Predominant rhythm is normal sinus rhythm.   The minimum heart rate recorded was 67 beats / minute (10/2/2020). The maximum heart rate is 127 beats / minute (10/2/2020). The mean heart rate is 87 beats / minute.   No evidence of AV block is noted.   Rare premature atrial contractions noted.   No evidence of supraventricular tachycardia is noted.  No evidence of ventricular tachycardia is noted.  No evidence of supraventricular arrhythmia is noted.  No evidence of ventricular arrhythmia is noted.  No pauses were noted.   NO SYMPTOMS     OUTCOME: Condition has improved and patient is now ready for discharge.    DISPOSITION: Home or Self Care    FINAL DIAGNOSIS:  <principal problem not specified>    FOLLOWUP: In clinic as scheduled    DISCHARGE INSTRUCTIONS:   Discharge Procedure Orders   Diet Cardiac     Notify your health care provider if you experience any of the following:  temperature >100.4     Notify your health  care provider if you experience any of the following:  severe uncontrolled pain     Notify your health care provider if you experience any of the following:  difficulty breathing or increased cough     Activity as tolerated     ASSESSMENT:  Chest Pain   - Trop negative x3 <0.010   - EKG shows no acute changes   - no previous stress testing or LHC    Palpitations    - SR on tele review & EKG  HLD  DM Type II  SHARON  Tobacco use  Bipolar  Schizophrenia   Venous insufficiency      PLAN:  EKG reviewed  Repeat ECHO pending   Restart home Lipitor & Fenofibrate  Start ASA  Okay to follow up in clinic consider Ca score as an outapatient  Will discharge home         TIME SPENT ON DISCHARGE: 37 minutes

## 2023-11-10 NOTE — DISCHARGE SUMMARY
"[facility] - [facility] Medical Telemetry  Short Stay Discharge Summary    Cardiology    Chief Complaint:  Chest Pain      HPI: 58-year-old male known to [facility]/Dr. Weller with PMHx of palpitations, HLD, DM type II, SHARON, schizophrenia, bipolar, and tobacco use. He presented to the ED with complaints of chest pain that began around 1800 on 11.9.23. He describes his pain as a chest heaviness that was localized to his left chest wall with radiation down his left arm. He also reports associated nausea and shortness of breath. He reports he has had these episodes about 2 to 3 times a week for the last few months or so. However, the occurrence that began yesterday afternoon was much worse in intensity. He spoke to his cardiologist who recommended presenting to the ED for further evaluation. He current reported he still has mild pain on presentation  but reports "I am fine now" He reports he is allergic to nitro and is unable to take. He denies any previous stress test due to hip pain and being claustrophobic. He has also never had an angiogram. VS on admit: HR 83, /84, RR 20, O2 sat 98%. Lab work: WBC 10.06, H/H 13.8/41.7, , Na 139, K 4.0, BUN/Cr 23.3/1.45, Ca 10, AST/ALT 26/22, BNP <10.0, negative trop x3, EKG shows no acute ST changes. CXR shows no acute findings. [facility] will admit for observation for chest pain.         PMH: Angina, Palpitations, Venous insufficiency, HLD, DM type II, SHARON, tobacco use, GERD, Bipolar, colon polyps, schizophrenia    PSH: cholecystectomy, colonoscopy, nasal sinus surgery, rotator cuff repair, tonsillectomy, tympanostomy tube placement   Family History: Father: CAD s/p CABG; Mother: CAD s/p CABG, asthma, COPD; Brother CAD, drug abuse; Sister: drug abuse  Social History: current tobacco use, denies ETOH or illict drug use      Previous Cardiac Diagnostics:   ECHO 11.10.23 ~ Pending      ECHO 5.9.23:  The study quality is average.   The left ventricle is normal in size. " Global left ventricular systolic function is normal. The left ventricular ejection fraction is 65%. Left ventricular diastolic function is normal.  Trace mitral regurgitation. Trace tricuspid regurgitation.   The estimated pulmonary artery systolic pressure is 18 mmHg assuming a right atrial pressure of 3 mmHg.     Carotid US 5.9.23  The study quality is average.   1-39% stenosis in the proximal right internal carotid artery based on Bluth Criteria.   1-39% stenosis in the proximal left internal carotid artery based on Bluth Criteria.   Antegrade right vertebral artery flow.   Antegrade left vertebral artery flow.      ECHO 6.18.22  The study quality is average.   Global left ventricular systolic function is normal. The left ventricular ejection fraction is 65%. Left ventricular diastolic function is normal.  Trace mitral and tricuspid  regurgitation. Mild calcification of the mitral valve is noted.   The pulmonary artery appears normal.      Holter Monitor 10.2.20  This is an excellent quality study.   Predominant rhythm is normal sinus rhythm.   The minimum heart rate recorded was 67 beats / minute (10/2/2020). The maximum heart rate is 127 beats / minute (10/2/2020). The mean heart rate is 87 beats / minute.   No evidence of AV block is noted.   Rare premature atrial contractions noted.   No evidence of supraventricular tachycardia is noted.  No evidence of ventricular tachycardia is noted.  No evidence of supraventricular arrhythmia is noted.  No evidence of ventricular arrhythmia is noted.  No pauses were noted.   NO SYMPTOMS     OUTCOME: Condition has improved and patient is now ready for discharge.    DISPOSITION: Home or Self Care    FINAL DIAGNOSIS:  <principal problem not specified>    FOLLOWUP: In clinic as scheduled    DISCHARGE INSTRUCTIONS:   Discharge Procedure Orders   Diet Cardiac     Notify your health care provider if you experience any of the following:  temperature >100.4     Notify your health  care provider if you experience any of the following:  severe uncontrolled pain     Notify your health care provider if you experience any of the following:  difficulty breathing or increased cough     Activity as tolerated     ASSESSMENT:  Chest Pain   - Trop negative x3 <0.010   - EKG shows no acute changes   - no previous stress testing or LHC    Palpitations    - SR on tele review & EKG  HLD  DM Type II  SHARON  Tobacco use  Bipolar  Schizophrenia   Venous insufficiency      PLAN:  EKG reviewed  Repeat ECHO pending   Restart home Lipitor & Fenofibrate  Start ASA  Okay to follow up in clinic consider Ca score as an outapatient  Will discharge home         TIME SPENT ON DISCHARGE: 37 minutes      I have reviewed the Nurse Practitioner's note, assessment and discharge plan. Medical decision making listed above were done under my guidance.

## 2023-11-10 NOTE — PLAN OF CARE
Pt lives with spouse.   11/10/23 1431   Discharge Assessment   Assessment Type Discharge Planning Assessment   Confirmed/corrected address, phone number and insurance Yes   Confirmed Demographics Correct on Facesheet   Source of Information patient;health record   When was your last doctors appointment?   (last month)   Communicated GARRET with patient/caregiver Yes   Reason For Admission chest pain   People in Home spouse   Facility Arrived From: home   Do you expect to return to your current living situation? Yes   Do you have help at home or someone to help you manage your care at home? No   Prior to hospitilization cognitive status: Alert/Oriented;No Deficits   Current cognitive status: Alert/Oriented;No Deficits   Walking or Climbing Stairs   (independent.)   Dressing/Bathing   (independent.)   Home Accessibility stairs to enter home   Number of Stairs, Main Entrance five   Home Layout Able to live on 1st floor   Equipment Currently Used at Home CPAP   Readmission within 30 days? No   Patient currently being followed by outpatient case management? No   Do you currently have service(s) that help you manage your care at home? No   Do you take prescription medications? Yes   Do you have prescription coverage? Yes   Coverage [employer]   Do you have any problems affording any of your prescribed medications? No   Is the patient taking medications as prescribed? yes   Who is going to help you get home at discharge? Spouse   How do you get to doctors appointments? car, drives self   Are you on dialysis? No   Do you take coumadin? No   DME Needed Upon Discharge  none   Discharge Plan discussed with: Patient   Transition of Care Barriers None   Discharge Plan A Home with family   Discharge Plan B Home Health   Financial Resource Strain   How hard is it for you to pay for the very basics like food, housing, medical care, and heating? Not very   Housing Stability   In the last 12 months, was there a time when you were  not able to pay the mortgage or rent on time? N   In the last 12 months, how many places have you lived? 1   In the last 12 months, was there a time when you did not have a steady place to sleep or slept in a shelter (including now)? N   Transportation Needs   In the past 12 months, has lack of transportation kept you from medical appointments or from getting medications? no   In the past 12 months, has lack of transportation kept you from meetings, work, or from getting things needed for daily living? No   Food Insecurity   Within the past 12 months, you worried that your food would run out before you got the money to buy more. Never true   Within the past 12 months, the food you bought just didn't last and you didn't have money to get more. Never true   Social Connections   In a typical week, how many times do you talk on the phone with family, friends, or neighbors? Once a week   How often do you get together with friends or relatives? Once   How often do you attend [employer] or [employer] services? More than 4   Do you belong to any clubs or organizations such as [employer] groups, unions, fraternal or athletic groups, or school groups? Yes   How often do you attend meetings of the clubs or organizations you belong to? More than 4   Are you , , , , never , or living with a partner? Living with   Alcohol Use   Q1: How often do you have a drink containing alcohol? Never   Q2: How many drinks containing alcohol do you have on a typical day when you are drinking? None   Q3: How often do you have six or more drinks on one occasion? Never   OTHER   Name(s) of People in Home Kings Kang (Spouse)   [phone] (Mobile)

## 2023-11-10 NOTE — PLAN OF CARE
Problem: Adult Inpatient Plan of Care  Goal: Plan of Care Review  11/10/2023 1026 by Princess Walter RN  Outcome: Ongoing, Progressing  11/10/2023 0756 by Princess Walter RN  Outcome: Ongoing, Progressing  Goal: Patient-Specific Goal (Individualized)  11/10/2023 1026 by Princess Walter RN  Outcome: Ongoing, Progressing  11/10/2023 0756 by Princess Walter RN  Outcome: Ongoing, Progressing  Goal: Absence of Hospital-Acquired Illness or Injury  11/10/2023 1026 by Princess Walter RN  Outcome: Ongoing, Progressing  11/10/2023 0756 by Princess Walter RN  Outcome: Ongoing, Progressing  Goal: Optimal Comfort and Wellbeing  11/10/2023 1026 by Princess Walter RN  Outcome: Ongoing, Progressing  11/10/2023 0756 by Princess Walter RN  Outcome: Ongoing, Progressing  Goal: Readiness for Transition of Care  11/10/2023 1026 by Princess Walter RN  Outcome: Ongoing, Progressing  11/10/2023 0756 by Princess Walter RN  Outcome: Ongoing, Progressing     Problem: Diabetes Comorbidity  Goal: Blood Glucose Level Within Targeted Range  11/10/2023 1026 by Princess Walter RN  Outcome: Ongoing, Progressing  11/10/2023 0756 by Princess Walter RN  Outcome: Ongoing, Progressing     Problem: Chest Pain  Goal: Resolution of Chest Pain Symptoms  11/10/2023 1026 by Princess Walter RN  Outcome: Ongoing, Progressing  11/10/2023 0756 by Princess Walter RN  Outcome: Ongoing, Progressing

## 2023-11-13 RX ORDER — ALPRAZOLAM 1 MG/1
1 TABLET ORAL NIGHTLY
Qty: 30 TABLET | Refills: 5 | Status: SHIPPED | OUTPATIENT
Start: 2023-11-13

## 2023-11-14 ENCOUNTER — OFFICE VISIT (OUTPATIENT)
Dept: INTERNAL MEDICINE | Facility: CLINIC | Age: 58
End: 2023-11-14
Payer: MEDICARE

## 2023-11-14 VITALS
RESPIRATION RATE: 16 BRPM | SYSTOLIC BLOOD PRESSURE: 110 MMHG | WEIGHT: 195 LBS | TEMPERATURE: 98 F | BODY MASS INDEX: 27.92 KG/M2 | HEART RATE: 64 BPM | DIASTOLIC BLOOD PRESSURE: 60 MMHG | HEIGHT: 70 IN | OXYGEN SATURATION: 97 %

## 2023-11-14 DIAGNOSIS — Z00.00 WELLNESS EXAMINATION: ICD-10-CM

## 2023-11-14 DIAGNOSIS — E78.49 OTHER HYPERLIPIDEMIA: ICD-10-CM

## 2023-11-14 DIAGNOSIS — Z12.5 PROSTATE CANCER SCREENING: ICD-10-CM

## 2023-11-14 DIAGNOSIS — F20.89 OTHER SCHIZOPHRENIA: ICD-10-CM

## 2023-11-14 DIAGNOSIS — F31.9 BIPOLAR AFFECTIVE DISORDER, REMISSION STATUS UNSPECIFIED: Primary | ICD-10-CM

## 2023-11-14 DIAGNOSIS — E11.9 TYPE 2 DIABETES MELLITUS WITHOUT COMPLICATION, WITHOUT LONG-TERM CURRENT USE OF INSULIN: ICD-10-CM

## 2023-11-14 PROCEDURE — 3061F NEG MICROALBUMINURIA REV: CPT | Mod: CPTII,,, | Performed by: INTERNAL MEDICINE

## 2023-11-14 PROCEDURE — 1160F RVW MEDS BY RX/DR IN RCRD: CPT | Mod: CPTII,,, | Performed by: INTERNAL MEDICINE

## 2023-11-14 PROCEDURE — 1159F MED LIST DOCD IN RCRD: CPT | Mod: CPTII,,, | Performed by: INTERNAL MEDICINE

## 2023-11-14 PROCEDURE — 3066F NEPHROPATHY DOC TX: CPT | Mod: CPTII,,, | Performed by: INTERNAL MEDICINE

## 2023-11-14 PROCEDURE — 2023F DILAT RTA XM W/O RTNOPTHY: CPT | Mod: CPTII,,, | Performed by: INTERNAL MEDICINE

## 2023-11-14 PROCEDURE — 3061F PR NEG MICROALBUMINURIA RESULT DOCUMENTED/REVIEW: ICD-10-PCS | Mod: CPTII,,, | Performed by: INTERNAL MEDICINE

## 2023-11-14 PROCEDURE — 99496 TRANSITIONAL CARE MANAGE SERVICE 7 DAY DISCHARGE: ICD-10-PCS | Mod: ,,, | Performed by: INTERNAL MEDICINE

## 2023-11-14 PROCEDURE — 99496 TRANSJ CARE MGMT HIGH F2F 7D: CPT | Mod: ,,, | Performed by: INTERNAL MEDICINE

## 2023-11-14 PROCEDURE — 2023F PR DILATED RETINAL EXAM W/O EVID OF RETINOPATHY: ICD-10-PCS | Mod: CPTII,,, | Performed by: INTERNAL MEDICINE

## 2023-11-14 PROCEDURE — 3044F HG A1C LEVEL LT 7.0%: CPT | Mod: CPTII,,, | Performed by: INTERNAL MEDICINE

## 2023-11-14 PROCEDURE — 3066F PR DOCUMENTATION OF TREATMENT FOR NEPHROPATHY: ICD-10-PCS | Mod: CPTII,,, | Performed by: INTERNAL MEDICINE

## 2023-11-14 PROCEDURE — 3078F DIAST BP <80 MM HG: CPT | Mod: CPTII,,, | Performed by: INTERNAL MEDICINE

## 2023-11-14 PROCEDURE — 3074F SYST BP LT 130 MM HG: CPT | Mod: CPTII,,, | Performed by: INTERNAL MEDICINE

## 2023-11-14 PROCEDURE — 1160F PR REVIEW ALL MEDS BY PRESCRIBER/CLIN PHARMACIST DOCUMENTED: ICD-10-PCS | Mod: CPTII,,, | Performed by: INTERNAL MEDICINE

## 2023-11-14 PROCEDURE — 3074F PR MOST RECENT SYSTOLIC BLOOD PRESSURE < 130 MM HG: ICD-10-PCS | Mod: CPTII,,, | Performed by: INTERNAL MEDICINE

## 2023-11-14 PROCEDURE — 3078F PR MOST RECENT DIASTOLIC BLOOD PRESSURE < 80 MM HG: ICD-10-PCS | Mod: CPTII,,, | Performed by: INTERNAL MEDICINE

## 2023-11-14 PROCEDURE — 3044F PR MOST RECENT HEMOGLOBIN A1C LEVEL <7.0%: ICD-10-PCS | Mod: CPTII,,, | Performed by: INTERNAL MEDICINE

## 2023-11-14 PROCEDURE — 1159F PR MEDICATION LIST DOCUMENTED IN MEDICAL RECORD: ICD-10-PCS | Mod: CPTII,,, | Performed by: INTERNAL MEDICINE

## 2023-11-14 RX ORDER — METOPROLOL SUCCINATE 25 MG/1
25 TABLET, EXTENDED RELEASE ORAL DAILY
COMMUNITY
Start: 2023-11-13

## 2023-11-14 RX ORDER — CLOPIDOGREL BISULFATE 75 MG/1
75 TABLET, FILM COATED ORAL DAILY
COMMUNITY
Start: 2023-11-13

## 2023-11-14 RX ORDER — PANTOPRAZOLE SODIUM 40 MG/1
40 TABLET, DELAYED RELEASE ORAL DAILY
Qty: 90 TABLET | Refills: 3 | Status: SHIPPED | OUTPATIENT
Start: 2023-11-14

## 2023-11-14 NOTE — PROGRESS NOTES
"A for select Subjective:       Patient ID: Sharan Cadena is a 58 y.o. male.      Patient Care Team:  Hamilton Tyler II, MD as PCP - General (Internal Medicine)    Chief Complaint: Follow-up    58-year-old male seen today for followup of schizophrenia, bipolar disorder, chronic neck and back pain, anxiety, sleep apnea, tobacco use, chronic rhinosinusitis, and peptic ulcer disease.  He was recently hospitalized with chest pain within the past week in his doing better now      Review of Systems   Constitutional:  Negative for fever.   HENT:  Negative for nosebleeds.    Eyes:  Negative for visual disturbance.   Respiratory:  Negative for shortness of breath.    Cardiovascular:  Negative for chest pain.   Gastrointestinal:  Negative for abdominal pain.   Genitourinary:  Negative for dysuria.   Musculoskeletal:  Negative for gait problem.   Neurological:  Negative for headaches.           Patient Reported Health Risk Assessment         Objective:      Physical Exam  HENT:      Head: Normocephalic.      Mouth/Throat:      Pharynx: Oropharynx is clear.   Eyes:      Extraocular Movements: Extraocular movements intact.   Cardiovascular:      Rate and Rhythm: Normal rate and regular rhythm.   Pulmonary:      Breath sounds: Normal breath sounds.   Abdominal:      Palpations: Abdomen is soft.   Musculoskeletal:         General: No swelling.   Skin:     General: Skin is warm.   Neurological:      General: No focal deficit present.      Mental Status: He is alert and oriented to person, place, and time.   Psychiatric:         Mood and Affect: Mood normal.         Vitals:    11/14/23 1347   BP: 110/60   Pulse: 64   Resp: 16   Temp: 98 °F (36.7 °C)   SpO2: 97%   Weight: 88.5 kg (195 lb)   Height: 5' 10" (1.778 m)                   No data to display                  11/14/2023     2:15 PM 7/12/2023    10:15 AM 5/15/2023    11:00 AM 1/6/2023     9:45 AM   Fall Risk Assessment - Outpatient   Mobility Status Ambulatory Ambulatory " Ambulatory Ambulatory   Number of falls 0 0 0 0   Identified as fall risk False False False False                  Assessment:       Problem List Items Addressed This Visit          Psychiatric    Bipolar disorder - Primary    Schizophrenia       Cardiac/Vascular    Other hyperlipidemia       Renal/    Prostate cancer screening       Endocrine    Type 2 diabetes mellitus without complication, without long-term current use of insulin       Other    RESOLVED: Wellness examination         Medication List with Changes/Refills   Current Medications    ALPRAZOLAM (XANAX) 1 MG TABLET    Take 1 tablet by mouth in the evening    ASPIRIN (ECOTRIN) 81 MG EC TABLET    Take 1 tablet (81 mg total) by mouth once daily.    ATORVASTATIN (LIPITOR) 40 MG TABLET    Take 40 mg by mouth every evening.    BENZTROPINE (COGENTIN) 2 MG TAB    Take 2 mg by mouth 3 (three) times daily.    FENOFIBRATE (TRICOR) 145 MG TABLET    Take 145 mg by mouth once daily.    FEXOFENADINE (ALLEGRA) 180 MG TABLET    Take 180 mg by mouth once daily.    FLUNISOLIDE 25 MCG, 0.025%, (NASALIDE) 25 MCG (0.025 %) SPRY    2 sprays by Nasal route 2 (two) times daily.    LINACLOTIDE (LINZESS) 290 MCG CAP CAPSULE    Take 1 capsule by mouth once daily    METOPROLOL SUCCINATE (TOPROL-XL) 25 MG 24 HR TABLET    Take 25 mg by mouth once daily.    OLANZAPINE (ZYPREXA) 15 MG TAB    Take 1 tablet by mouth Daily.    OXCARBAZEPINE (TRILEPTAL) 150 MG TAB    Take 150 mg by mouth 2 (two) times a day.    PANTOPRAZOLE (PROTONIX) 40 MG TABLET    Take 40 mg by mouth once daily.    PLAVIX 75 MG TABLET    Take 75 mg by mouth once daily.    QUETIAPINE (SEROQUEL XR) 300 MG TB24    Take 2 tablets by mouth every evening.   Discontinued Medications    TAMSULOSIN (FLOMAX) 0.4 MG CAP    Take 1 capsule (0.4 mg total) by mouth once daily.    TRAMADOL (ULTRAM) 50 MG TABLET    Take 1 tablet (50 mg total) by mouth every 6 (six) hours.        Plan:       1. Schizophrenia: He is seen by Dr. Thorne and  his nurse practitioner regularly.      2. Chronic neck and back pain: Stable. He underwent sacroiliac joint fusion with Dr. Aldo Conway in 2019 with good results.     3. Sleep apnea: Continue CPAP.      4. Tobacco use: He was smoking 1 pack per day and is down to a 1/2 PPD, trying to cut back; tried Chantix in 2023     5. Peptic ulcer disease: Restart pantoprazole     6. Chronic rhinosinusitis: He also has asthma. Because of ongoing cough, referred back to ENT and was given medication for allergies; Flonase was started     7. Hyperlipidemia: He is followed by cardiology. Continue atorvastatin     8. Diabetes: A1c 5.1 again.  He stopped metformin in 2022 because of weight loss.  Stopped Ozempic since last visit    9. Edema: Lasix as needed     10. Asthma: Stable. Pneumovax 2019     11.  Chronic constipation: He has tried over-the-counter laxatives. SBO in 10/2021. Linzess working well     12. Recurrent nephrolithiasis: Last episode 5/2023, Dr. Silas Ramírez following    13. Wellness: Colonoscopy in 6/2017 with polyps, every 5 years. Pneumovax 2019     Chest pain:  Hospitalized in 11/2023.  Cardiac enzymes, EKG, and echocardiogram were normal.  He is scheduled for angiogram later this week.  His chest pain may be from acid reflux.  He was on omeprazole, but stopped it sometime in 2023.  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 Only plays at home now      Transitional Care Note    Family and/or Caretaker present at visit?  No.  Diagnostic tests reviewed/disposition: No diagnosic tests pending after this hospitalization.  Disease/illness education: yes  Home health/community services discussion/referrals: Patient does not have home health established from hospital visit.  They do not need home health.  If needed, we will set up home health for the patient.   Establishment or re-establishment of referral orders for community resources: No other  necessary community resources.   Discussion with other health care providers: No discussion with other health care providers necessary.                Medicare Annual Wellness and Personalized Prevention Plan:   Fall Risk + Home Safety + Hearing Impairment + Depression Screen + Cognitive Impairment Screen + Health Risk Assessment all reviewed    Health Maintenance Topics with due status: Not Due       Topic Last Completion Date    TETANUS VACCINE 04/26/2019    Diabetes Urine Screening 07/07/2023    Lipid Panel 07/07/2023    Hemoglobin A1c 07/07/2023    Low Dose Statin 11/10/2023      The patient's Health Maintenance was reviewed and the following appears to be due at this time:   Health Maintenance Due   Topic Date Due    Hepatitis C Screening  Never done    Foot Exam  Never done    HIV Screening  Never done    Shingles Vaccine (3 of 3) 09/14/2019    Colorectal Cancer Screening  06/02/2022    COVID-19 Vaccine (5 - 2023-24 season) 09/01/2023    RSV Vaccine (Age 60+) (1 - 1-dose 60+ series) Never done    Eye Exam  02/02/2024       Advance Care Planning   I attest to discussing Advance Care Planning with patient and/or family member.  Education was provided including the importance of the Health Care Power of , Advance Directives, and/or [place] documentation.  The patient expressed understanding to the importance of this information and discussion.  Length of ACP conversation in minutes: 0       Opioid Screening: Patient medication list reviewed, patient is not taking prescription opioids. Patient is not using additional opioids than prescribed. Patient is at low risk of substance abuse based on this opioid use history.     No follow-ups on file. In addition to their scheduled follow up, the patient has also been instructed to follow up on as needed basis.

## 2023-11-17 ENCOUNTER — HOSPITAL ENCOUNTER (OUTPATIENT)
Facility: HOSPITAL | Age: 58
Discharge: HOME | End: 2023-11-17
Attending: INTERNAL MEDICINE | Admitting: INTERNAL MEDICINE
Payer: MEDICARE

## 2023-11-17 VITALS
WEIGHT: 195.56 LBS | SYSTOLIC BLOOD PRESSURE: 109 MMHG | DIASTOLIC BLOOD PRESSURE: 72 MMHG | BODY MASS INDEX: 28 KG/M2 | OXYGEN SATURATION: 98 % | HEIGHT: 70 IN | HEART RATE: 72 BPM | RESPIRATION RATE: 20 BRPM | TEMPERATURE: 98 F

## 2023-11-17 DIAGNOSIS — I20.89 STABLE ANGINA: ICD-10-CM

## 2023-11-17 LAB — CATH EF QUANTITATIVE: 55 %

## 2023-11-17 PROCEDURE — C1887 CATHETER, GUIDING: HCPCS | Performed by: INTERNAL MEDICINE

## 2023-11-17 PROCEDURE — 63600175 PHARM REV CODE 636 W HCPCS: Performed by: INTERNAL MEDICINE

## 2023-11-17 PROCEDURE — C1894 INTRO/SHEATH, NON-LASER: HCPCS | Performed by: INTERNAL MEDICINE

## 2023-11-17 PROCEDURE — 27201423 OPTIME MED/SURG SUP & DEVICES STERILE SUPPLY: Performed by: INTERNAL MEDICINE

## 2023-11-17 PROCEDURE — 93458 L HRT ARTERY/VENTRICLE ANGIO: CPT | Performed by: INTERNAL MEDICINE

## 2023-11-17 PROCEDURE — C1769 GUIDE WIRE: HCPCS | Performed by: INTERNAL MEDICINE

## 2023-11-17 PROCEDURE — 25000003 PHARM REV CODE 250: Performed by: INTERNAL MEDICINE

## 2023-11-17 PROCEDURE — 25500020 PHARM REV CODE 255: Performed by: INTERNAL MEDICINE

## 2023-11-17 PROCEDURE — 99152 MOD SED SAME PHYS/QHP 5/>YRS: CPT | Performed by: INTERNAL MEDICINE

## 2023-11-17 RX ORDER — SODIUM CHLORIDE 9 MG/ML
INJECTION, SOLUTION INTRAVENOUS ONCE
Status: COMPLETED | OUTPATIENT
Start: 2023-11-17 | End: 2023-11-17

## 2023-11-17 RX ORDER — ONDANSETRON 4 MG/1
8 TABLET, ORALLY DISINTEGRATING ORAL EVERY 8 HOURS PRN
Status: DISCONTINUED | OUTPATIENT
Start: 2023-11-17 | End: 2023-11-17 | Stop reason: HOSPADM

## 2023-11-17 RX ORDER — MIDAZOLAM HYDROCHLORIDE 1 MG/ML
INJECTION INTRAMUSCULAR; INTRAVENOUS
Status: DISCONTINUED | OUTPATIENT
Start: 2023-11-17 | End: 2023-11-17 | Stop reason: HOSPADM

## 2023-11-17 RX ORDER — LORAZEPAM 2 MG/ML
INJECTION INTRAMUSCULAR
Status: DISCONTINUED | OUTPATIENT
Start: 2023-11-17 | End: 2023-11-17 | Stop reason: HOSPADM

## 2023-11-17 RX ORDER — SODIUM CHLORIDE 9 MG/ML
INJECTION, SOLUTION INTRAVENOUS CONTINUOUS
Status: DISCONTINUED | OUTPATIENT
Start: 2023-11-17 | End: 2023-11-17 | Stop reason: HOSPADM

## 2023-11-17 RX ORDER — DIPHENHYDRAMINE HCL 25 MG
50 CAPSULE ORAL
Status: ACTIVE | OUTPATIENT
Start: 2023-11-17

## 2023-11-17 RX ORDER — VERAPAMIL HYDROCHLORIDE 2.5 MG/ML
INJECTION, SOLUTION INTRAVENOUS
Status: DISCONTINUED | OUTPATIENT
Start: 2023-11-17 | End: 2023-11-17 | Stop reason: HOSPADM

## 2023-11-17 RX ORDER — ACETAMINOPHEN 325 MG/1
650 TABLET ORAL EVERY 4 HOURS PRN
Status: DISCONTINUED | OUTPATIENT
Start: 2023-11-17 | End: 2023-11-17 | Stop reason: HOSPADM

## 2023-11-17 RX ORDER — HEPARIN SODIUM 1000 [USP'U]/ML
INJECTION, SOLUTION INTRAVENOUS; SUBCUTANEOUS
Status: DISCONTINUED | OUTPATIENT
Start: 2023-11-17 | End: 2023-11-17 | Stop reason: HOSPADM

## 2023-11-17 RX ORDER — LIDOCAINE HYDROCHLORIDE 10 MG/ML
INJECTION INFILTRATION; PERINEURAL
Status: DISCONTINUED | OUTPATIENT
Start: 2023-11-17 | End: 2023-11-17 | Stop reason: HOSPADM

## 2023-11-17 RX ORDER — DIAZEPAM 5 MG/1
10 TABLET ORAL ONCE
Status: COMPLETED | OUTPATIENT
Start: 2023-11-17 | End: 2023-11-17

## 2023-11-17 RX ORDER — FENTANYL CITRATE 50 UG/ML
INJECTION, SOLUTION INTRAMUSCULAR; INTRAVENOUS
Status: DISCONTINUED | OUTPATIENT
Start: 2023-11-17 | End: 2023-11-17 | Stop reason: HOSPADM

## 2023-11-17 RX ADMIN — DIPHENHYDRAMINE HYDROCHLORIDE 50 MG: 25 CAPSULE ORAL at 02:11

## 2023-11-17 RX ADMIN — DIAZEPAM 10 MG: 5 TABLET ORAL at 02:11

## 2023-11-17 RX ADMIN — SODIUM CHLORIDE: 9 INJECTION, SOLUTION INTRAVENOUS at 11:11

## 2023-11-17 NOTE — Clinical Note
The catheter was inserted into the, was removed from the and was inserted over the wire into the ostium   left anterior descending. Hemodynamics were performed.  An angiography was performed of the left coronary arteries. Multiple views were taken. The angiography was performed via power injection.

## 2023-11-17 NOTE — DISCHARGE INSTRUCTIONS
Remove armboard and dressing in 24hrs  -No driving for two Days  -Do not lift anything heavier than a gallon of milk for 5 days  -No not submerge the site in water for 5 days.   -No lotions, powders, creams around site for 5 days  - Return to the nearest emergency room if you start running a fever; have any kind of discharge coming from the site, the site looks red or swollen.  - If site starts to bleed, lay flat on the ground, apply pressure to the site and call 911.

## 2023-11-17 NOTE — Clinical Note
The catheter was inserted into the, was removed from the and was inserted over the wire into the left ventricle. Hemodynamics were performed.   55%.

## 2023-11-17 NOTE — H&P
Patient name: Sharan Cadena  MRN: [id number]  : 1965  Cath Lab Procedure H&P Update    Pre-Procedure Assessment:    I saw and examined the patient face to face. The patient has been re-evaluated and his condition is unchanged. The reason for admission, procedure and care is still present.  Based on the patients H&P, pre-procedure physical exam, relevant diagnostic studies, NPO status and information obtained from the patient, I determine the patient is an appropriate candidate for the proposed procedure and anesthesia planned. I further certify the anesthesia risks, benefits and options have been explained to the patient to which he agrees as documented on the procedural consent.    See scanned updated H&P and additional records from media tab.      Patrick Weller

## 2023-12-07 NOTE — DISCHARGE SUMMARY
[facility] - Cath Lab Services  Discharge Note  Short Stay    Procedure(s) (LRB):  Left heart cath (Left)      OUTCOME: Patient tolerated treatment/procedure well without complication and is now ready for discharge.    DISPOSITION: Home or Self Care    FINAL DIAGNOSIS:  Acute chest pain    FOLLOWUP: In clinic    DISCHARGE INSTRUCTIONS:    Discharge Procedure Orders   Diet general         Clinical Reference Documents Added to Patient Instructions         Document    CARDIAC CATHETERIZATION (ENGLISH)            TIME SPENT ON DISCHARGE: 33 minutes

## 2023-12-22 DIAGNOSIS — E78.49 OTHER HYPERLIPIDEMIA: ICD-10-CM

## 2023-12-22 RX ORDER — LINACLOTIDE 290 UG/1
CAPSULE, GELATIN COATED ORAL
Qty: 30 CAPSULE | Refills: 5 | Status: SHIPPED | OUTPATIENT
Start: 2023-12-22

## 2024-01-04 ENCOUNTER — LAB VISIT (OUTPATIENT)
Dept: LAB | Facility: HOSPITAL | Age: 59
End: 2024-01-04
Attending: INTERNAL MEDICINE
Payer: MEDICARE

## 2024-01-04 DIAGNOSIS — F31.9 BIPOLAR AFFECTIVE DISORDER, REMISSION STATUS UNSPECIFIED: ICD-10-CM

## 2024-01-04 DIAGNOSIS — F20.9 SCHIZOPHRENIA, UNSPECIFIED TYPE: ICD-10-CM

## 2024-01-04 DIAGNOSIS — Z12.5 PROSTATE CANCER SCREENING: ICD-10-CM

## 2024-01-04 DIAGNOSIS — E78.49 OTHER HYPERLIPIDEMIA: ICD-10-CM

## 2024-01-04 DIAGNOSIS — Z00.00 WELLNESS EXAMINATION: ICD-10-CM

## 2024-01-04 DIAGNOSIS — E11.9 TYPE 2 DIABETES MELLITUS WITHOUT COMPLICATION, WITHOUT LONG-TERM CURRENT USE OF INSULIN: ICD-10-CM

## 2024-01-04 DIAGNOSIS — G47.33 OBSTRUCTIVE SLEEP APNEA SYNDROME: ICD-10-CM

## 2024-01-04 LAB
ALBUMIN SERPL-MCNC: 4.3 G/DL (ref 3.4–4.8)
ALBUMIN/GLOB SERPL: 1.6 RATIO (ref 1.1–2)
ALP SERPL-CCNC: 41 UNIT/L (ref 40–150)
ALT SERPL-CCNC: 23 UNIT/L (ref 0–55)
APPEARANCE UR: CLEAR
AST SERPL-CCNC: 26 UNIT/L (ref 5–34)
BACTERIA #/AREA URNS AUTO: ABNORMAL /HPF
BASOPHILS # BLD AUTO: 0.07 X10(3)/MCL
BASOPHILS NFR BLD AUTO: 0.7 %
BILIRUB SERPL-MCNC: 0.7 MG/DL
BILIRUB UR QL STRIP.AUTO: NEGATIVE
BUN SERPL-MCNC: 22.1 MG/DL (ref 8.4–25.7)
CALCIUM SERPL-MCNC: 9.5 MG/DL (ref 8.8–10)
CHLORIDE SERPL-SCNC: 108 MMOL/L (ref 98–107)
CHOLEST SERPL-MCNC: 115 MG/DL
CHOLEST/HDLC SERPL: 3 {RATIO} (ref 0–5)
CO2 SERPL-SCNC: 26 MMOL/L (ref 23–31)
COLOR UR AUTO: YELLOW
CREAT SERPL-MCNC: 1.22 MG/DL (ref 0.73–1.18)
CREAT UR-MCNC: 101.1 MG/DL (ref 63–166)
EOSINOPHIL # BLD AUTO: 0.3 X10(3)/MCL (ref 0–0.9)
EOSINOPHIL NFR BLD AUTO: 3.1 %
ERYTHROCYTE [DISTWIDTH] IN BLOOD BY AUTOMATED COUNT: 14.5 % (ref 11.5–17)
EST. AVERAGE GLUCOSE BLD GHB EST-MCNC: 102.5 MG/DL
GFR SERPLBLD CREATININE-BSD FMLA CKD-EPI: >60 MLS/MIN/1.73/M2
GLOBULIN SER-MCNC: 2.7 GM/DL (ref 2.4–3.5)
GLUCOSE SERPL-MCNC: 102 MG/DL (ref 82–115)
GLUCOSE UR QL STRIP.AUTO: NORMAL
HBA1C MFR BLD: 5.2 %
HCT VFR BLD AUTO: 43.6 % (ref 42–52)
HDLC SERPL-MCNC: 41 MG/DL (ref 35–60)
HGB BLD-MCNC: 14.6 G/DL (ref 14–18)
IMM GRANULOCYTES # BLD AUTO: 0.04 X10(3)/MCL (ref 0–0.04)
IMM GRANULOCYTES NFR BLD AUTO: 0.4 %
KETONES UR QL STRIP.AUTO: NEGATIVE
LDLC SERPL CALC-MCNC: 54 MG/DL (ref 50–140)
LEUKOCYTE ESTERASE UR QL STRIP.AUTO: NEGATIVE
LYMPHOCYTES # BLD AUTO: 2.48 X10(3)/MCL (ref 0.6–4.6)
LYMPHOCYTES NFR BLD AUTO: 26 %
MCH RBC QN AUTO: 32.1 PG (ref 27–31)
MCHC RBC AUTO-ENTMCNC: 33.5 G/DL (ref 33–36)
MCV RBC AUTO: 95.8 FL (ref 80–94)
MICROALBUMIN UR-MCNC: <5 UG/ML
MICROALBUMIN/CREAT RATIO PNL UR: NORMAL
MONOCYTES # BLD AUTO: 0.73 X10(3)/MCL (ref 0.1–1.3)
MONOCYTES NFR BLD AUTO: 7.7 %
MUCOUS THREADS URNS QL MICRO: ABNORMAL /LPF
NEUTROPHILS # BLD AUTO: 5.92 X10(3)/MCL (ref 2.1–9.2)
NEUTROPHILS NFR BLD AUTO: 62.1 %
NITRITE UR QL STRIP.AUTO: NEGATIVE
NRBC BLD AUTO-RTO: 0 %
PH UR STRIP.AUTO: 5.5 [PH]
PLATELET # BLD AUTO: 224 X10(3)/MCL (ref 130–400)
PMV BLD AUTO: 11.2 FL (ref 7.4–10.4)
POTASSIUM SERPL-SCNC: 4.4 MMOL/L (ref 3.5–5.1)
PROT SERPL-MCNC: 7 GM/DL (ref 5.8–7.6)
PROT UR QL STRIP.AUTO: NEGATIVE
RBC # BLD AUTO: 4.55 X10(6)/MCL (ref 4.7–6.1)
RBC #/AREA URNS AUTO: ABNORMAL /HPF
RBC UR QL AUTO: NEGATIVE
SODIUM SERPL-SCNC: 139 MMOL/L (ref 136–145)
SP GR UR STRIP.AUTO: 1.02 (ref 1–1.03)
SQUAMOUS #/AREA URNS LPF: ABNORMAL /HPF
TRIGL SERPL-MCNC: 102 MG/DL (ref 34–140)
TSH SERPL-ACNC: 1.25 UIU/ML (ref 0.35–4.94)
UROBILINOGEN UR STRIP-ACNC: NORMAL
VLDLC SERPL CALC-MCNC: 20 MG/DL
WBC # SPEC AUTO: 9.54 X10(3)/MCL (ref 4.5–11.5)
WBC #/AREA URNS AUTO: ABNORMAL /HPF

## 2024-01-04 PROCEDURE — 81001 URINALYSIS AUTO W/SCOPE: CPT

## 2024-01-04 PROCEDURE — 36415 COLL VENOUS BLD VENIPUNCTURE: CPT

## 2024-01-04 PROCEDURE — 84443 ASSAY THYROID STIM HORMONE: CPT

## 2024-01-04 PROCEDURE — 80061 LIPID PANEL: CPT

## 2024-01-04 PROCEDURE — 80053 COMPREHEN METABOLIC PANEL: CPT

## 2024-01-04 PROCEDURE — 82043 UR ALBUMIN QUANTITATIVE: CPT

## 2024-01-04 PROCEDURE — 83036 HEMOGLOBIN GLYCOSYLATED A1C: CPT

## 2024-01-04 PROCEDURE — 85025 COMPLETE CBC W/AUTO DIFF WBC: CPT

## 2024-01-10 ENCOUNTER — OFFICE VISIT (OUTPATIENT)
Dept: INTERNAL MEDICINE | Facility: CLINIC | Age: 59
End: 2024-01-10
Payer: MEDICARE

## 2024-01-10 VITALS
DIASTOLIC BLOOD PRESSURE: 70 MMHG | WEIGHT: 200 LBS | TEMPERATURE: 98 F | RESPIRATION RATE: 16 BRPM | HEART RATE: 64 BPM | OXYGEN SATURATION: 99 % | SYSTOLIC BLOOD PRESSURE: 112 MMHG | BODY MASS INDEX: 28.63 KG/M2 | HEIGHT: 70 IN

## 2024-01-10 DIAGNOSIS — F31.9 BIPOLAR AFFECTIVE DISORDER, REMISSION STATUS UNSPECIFIED: ICD-10-CM

## 2024-01-10 DIAGNOSIS — R73.01 IMPAIRED FASTING GLUCOSE: Primary | ICD-10-CM

## 2024-01-10 DIAGNOSIS — Z12.5 PROSTATE CANCER SCREENING: ICD-10-CM

## 2024-01-10 DIAGNOSIS — E78.49 OTHER HYPERLIPIDEMIA: ICD-10-CM

## 2024-01-10 DIAGNOSIS — Z00.00 WELLNESS EXAMINATION: ICD-10-CM

## 2024-01-10 DIAGNOSIS — F20.89 OTHER SCHIZOPHRENIA: ICD-10-CM

## 2024-01-10 PROBLEM — E11.9 TYPE 2 DIABETES MELLITUS WITHOUT COMPLICATION, WITHOUT LONG-TERM CURRENT USE OF INSULIN: Status: RESOLVED | Noted: 2023-11-14 | Resolved: 2024-01-10

## 2024-01-10 PROCEDURE — 1159F MED LIST DOCD IN RCRD: CPT | Mod: CPTII,,, | Performed by: INTERNAL MEDICINE

## 2024-01-10 PROCEDURE — 3078F DIAST BP <80 MM HG: CPT | Mod: CPTII,,, | Performed by: INTERNAL MEDICINE

## 2024-01-10 PROCEDURE — G0439 PPPS, SUBSEQ VISIT: HCPCS | Mod: ,,, | Performed by: INTERNAL MEDICINE

## 2024-01-10 PROCEDURE — 1160F RVW MEDS BY RX/DR IN RCRD: CPT | Mod: CPTII,,, | Performed by: INTERNAL MEDICINE

## 2024-01-10 PROCEDURE — 3066F NEPHROPATHY DOC TX: CPT | Mod: CPTII,,, | Performed by: INTERNAL MEDICINE

## 2024-01-10 PROCEDURE — 3061F NEG MICROALBUMINURIA REV: CPT | Mod: CPTII,,, | Performed by: INTERNAL MEDICINE

## 2024-01-10 PROCEDURE — 3044F HG A1C LEVEL LT 7.0%: CPT | Mod: CPTII,,, | Performed by: INTERNAL MEDICINE

## 2024-01-10 PROCEDURE — 3074F SYST BP LT 130 MM HG: CPT | Mod: CPTII,,, | Performed by: INTERNAL MEDICINE

## 2024-01-10 NOTE — PROGRESS NOTES
A for select Subjective:       Patient ID: Sharan Cadena is a 58 y.o. male.      Patient Care Team:  Hamilton Tyler II, MD as PCP - General (Internal Medicine)    Chief Complaint: Medicare AWV Follow Up, Hyperlipidemia, Diabetes, Sleep Apnea, and Manic Behavior    58-year-old male seen today for followup of schizophrenia, bipolar disorder, chronic neck and back pain, anxiety, sleep apnea, tobacco use, chronic rhinosinusitis, and peptic ulcer disease.        Review of Systems   Constitutional:  Negative for fever.   HENT:  Negative for nosebleeds.    Eyes:  Negative for visual disturbance.   Respiratory:  Negative for shortness of breath.    Cardiovascular:  Negative for chest pain.   Gastrointestinal:  Negative for abdominal pain.   Genitourinary:  Negative for dysuria.   Musculoskeletal:  Negative for gait problem.   Neurological:  Negative for headaches.           Patient Reported Health Risk Assessment  Are you male or female?: Male  During the past four weeks, how much have you been bothered by emotional problems such as feeling anxious, depressed, irritable, sad, or downhearted and blue?: Moderately  During the past five weeks, has your physical and/or emotional health limited your social activities with family, friends, neighbors, or groups?: Slightly  During the past four weeks, how much bodily pain have you generally had?: No pain  During the past four weeks, was someone available to help if you needed and wanted help?: Yes, as much as I wanted  During the past four weeks, what was the hardest physical activity you could do for at least two minutes?: Moderate  Can you get to places out of walking distance without help?  (For example, can you travel alone on buses or taxis, or drive your own car?): Yes  Can you go shopping for groceries or clothes without someone's help?: Yes  Can you prepare your own meals?: Yes  Can you do your own housework without help?: Yes  Because of any health problems, do you  need the help of another person with your personal care needs such as eating, bathing, dressing, or getting around the house?: No  Can you handle your own money without help?: Yes  During the past four weeks, how would you rate your health in general?: Good  How have things been going for you during the past four weeks?: Very well  Are you having difficulties driving your car?: No  Do you always fasten your seat belt when you are in a car?: Yes, usually  How often in the past four weeks have you been bothered by falling or dizzy when standing up?: Sometimes  How often in the past four weeks have you been bothered by sexual problems?: Never  How often in the past four weeks have you been bothered by trouble eating well?: Never  How often in the past four weeks have you been bothered by teeth or denture problems?: Never  How often in the past four weeks have you been bothered with problems using the telephone?: Never  How often in the past four weeks have you been bothered by tiredness or fatigue?: Often  Have you fallen two or more times in the past year?: No  Are you afraid of falling?: No  Are you a smoker?: Yes, I'm not ready to quit  During the past four weeks, how many drinks of wine, beer, or other alcoholic beverages did you have?: No alcohol at all  Do you exercise for about 20 minutes three or more days a week?: Yes, most of the time  Have you been given any information to help you with hazards in your house that might hurt you?: No  Have you been given any information to help you with keeping track of your medications?: No  How often do you have trouble taking medicines the way you've been told to take them?: I always take them as prescribed  How confident are you that you can control and manage most of your health problems?: Somewhat confident  What is your race? (Check all that apply.):       Objective:      Physical Exam  HENT:      Head: Normocephalic.      Mouth/Throat:      Pharynx:  "Oropharynx is clear.   Eyes:      Extraocular Movements: Extraocular movements intact.   Cardiovascular:      Rate and Rhythm: Normal rate and regular rhythm.   Pulmonary:      Breath sounds: Normal breath sounds.   Abdominal:      Palpations: Abdomen is soft.   Musculoskeletal:         General: No swelling.   Skin:     General: Skin is warm.   Neurological:      General: No focal deficit present.      Mental Status: He is alert and oriented to person, place, and time.   Psychiatric:         Mood and Affect: Mood normal.         Vitals:    01/10/24 0936   BP: 112/70   Pulse: 64   Resp: 16   Temp: 97.6 °F (36.4 °C)   SpO2: 99%   Weight: 90.7 kg (200 lb)   Height: 5' 10" (1.778 m)                   No data to display                  1/10/2024     9:45 AM 11/14/2023     2:15 PM 7/12/2023    10:15 AM 5/15/2023    11:00 AM 1/6/2023     9:45 AM   Fall Risk Assessment - Outpatient   Mobility Status Ambulatory Ambulatory Ambulatory Ambulatory Ambulatory   Number of falls 0 0 0 0 0   Identified as fall risk False False False False False           Depression Screening  Over the past two weeks, has the patient felt down, depressed, or hopeless?: No  Over the past two weeks, has the patient felt little interest or pleasure in doing things?: No  Functional Ability/Safety Screening  Was the patient's timed Up & Go test unsteady or longer than 30 seconds?: No  Does the patient need help with phone, transportation, shopping, preparing meals, housework, laundry, meds, or managing money?: No  Does the patient's home have rugs in the hallway, lack grab bars in the bathroom, lack handrails on the stairs or have poor lighting?: No  Have you noticed any hearing difficulties?: No  Cognitive Function (Assessed through direct observation with due consideration of information obtained by way of patient reports and/or concerns raised by family, friends, caretakers, or others)    Does the patient repeat questions/statements in the same day?: " No  Does the patient have trouble remembering the date, year, and time?: No  Does the patient have difficulty managing finances?: No  Does the patient have a decreased sense of direction?: No      Assessment:       Problem List Items Addressed This Visit          Psychiatric    Bipolar disorder    Relevant Orders    CBC Auto Differential    Comprehensive Metabolic Panel    Lipid Panel    Microalbumin/Creatinine Ratio, Urine    Urinalysis, Reflex to Urine Culture    TSH    Hemoglobin A1C    Schizophrenia    Relevant Orders    CBC Auto Differential    Comprehensive Metabolic Panel    Lipid Panel    Microalbumin/Creatinine Ratio, Urine    Urinalysis, Reflex to Urine Culture    TSH    Hemoglobin A1C       Cardiac/Vascular    Other hyperlipidemia    Relevant Orders    CBC Auto Differential    Comprehensive Metabolic Panel    Lipid Panel    Microalbumin/Creatinine Ratio, Urine    Urinalysis, Reflex to Urine Culture    TSH    Hemoglobin A1C       Renal/    Prostate cancer screening    Relevant Orders    CBC Auto Differential    Comprehensive Metabolic Panel    Lipid Panel    Microalbumin/Creatinine Ratio, Urine    Urinalysis, Reflex to Urine Culture    TSH    Hemoglobin A1C       Endocrine    Impaired fasting glucose - Primary    Relevant Orders    CBC Auto Differential    Comprehensive Metabolic Panel    Lipid Panel    Microalbumin/Creatinine Ratio, Urine    Urinalysis, Reflex to Urine Culture    TSH    Hemoglobin A1C       Other    RESOLVED: Wellness examination    Relevant Orders    CBC Auto Differential    Comprehensive Metabolic Panel    Lipid Panel    Microalbumin/Creatinine Ratio, Urine    Urinalysis, Reflex to Urine Culture    TSH    Hemoglobin A1C         Medication List with Changes/Refills   Current Medications    ALPRAZOLAM (XANAX) 1 MG TABLET    Take 1 tablet by mouth in the evening    ASPIRIN (ECOTRIN) 81 MG EC TABLET    Take 1 tablet (81 mg total) by mouth once daily.    ATORVASTATIN (LIPITOR) 40 MG TABLET     Take 40 mg by mouth every evening.    BENZTROPINE (COGENTIN) 2 MG TAB    Take 2 mg by mouth 3 (three) times daily.    FENOFIBRATE (TRICOR) 145 MG TABLET    Take 145 mg by mouth once daily.    FEXOFENADINE (ALLEGRA) 180 MG TABLET    Take 180 mg by mouth once daily.    FLUNISOLIDE 25 MCG, 0.025%, (NASALIDE) 25 MCG (0.025 %) SPRY    2 sprays by Nasal route 2 (two) times daily.    LINACLOTIDE (LINZESS) 290 MCG CAP CAPSULE    Take 1 capsule by mouth once daily    METOPROLOL SUCCINATE (TOPROL-XL) 25 MG 24 HR TABLET    Take 25 mg by mouth once daily.    OLANZAPINE (ZYPREXA) 15 MG TAB    Take 1 tablet by mouth Daily.    OXCARBAZEPINE (TRILEPTAL) 150 MG TAB    Take 150 mg by mouth 2 (two) times a day.    PANTOPRAZOLE (PROTONIX) 40 MG TABLET    Take 1 tablet (40 mg total) by mouth once daily.    PLAVIX 75 MG TABLET    Take 75 mg by mouth once daily.    QUETIAPINE (SEROQUEL XR) 300 MG TB24    Take 2 tablets by mouth every evening.        Plan:       1. Schizophrenia: He is seen by Dr. Thorne and his nurse practitioner regularly.      2. Chronic neck and back pain: Stable. He underwent sacroiliac joint fusion with Dr. Aldo Conway in 2019 with good results.     3. Sleep apnea: Continue CPAP.      4. Tobacco use: He was smoking 1 pack per day and is down to a 1/2 PPD, trying to cut back; tried Chantix in 2023     5. Peptic ulcer disease:  Restarted pantoprazole last visit, chest pain resolved.  Working very well     6. Chronic rhinosinusitis: He also has asthma. Because of ongoing cough, referred back to ENT and was given medication for allergies; Flonase was started     7. Hyperlipidemia: He is followed by cardiology. Continue atorvastatin     8. Impaired fasting glucose: A1c 5.2 again.  He stopped metformin in 2022 because of weight loss.  Stopped Ozempic in 2023 because it was not helping with weight loss.  Diabetes has resolved    9. Edema: Lasix as needed     10. Asthma: Stable. Pneumovax 2019     11.  Chronic constipation:  He has tried over-the-counter laxatives. SBO in 2021. Linzess working well     12. Recurrent nephrolithiasis: Last episode 5/2023, Dr. Silas Ramírez following    13. Wellness: Colonoscopy in 6/2017 with polyps, every 5 years. 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 Only plays at home now                 Medicare Annual Wellness and Personalized Prevention Plan:   Fall Risk + Home Safety + Hearing Impairment + Depression Screen + Cognitive Impairment Screen + Health Risk Assessment all reviewed    Health Maintenance Topics with due status: Not Due       Topic Last Completion Date    TETANUS VACCINE 04/26/2019    High Dose Statin 11/17/2023    Diabetes Urine Screening 01/04/2024    Lipid Panel 01/04/2024    Hemoglobin A1c 01/04/2024      The patient's Health Maintenance was reviewed and the following appears to be due at this time:   Health Maintenance Due   Topic Date Due    Hepatitis C Screening  Never done    Foot Exam  Never done    HIV Screening  Never done    Shingles Vaccine (3 of 3) 09/14/2019    Colorectal Cancer Screening  06/02/2022    RSV Vaccine (Age 60+ and Pregnant patients) (1 - 1-dose 60+ series) Never done    Eye Exam  02/02/2024       Advance Care Planning   I attest to discussing Advance Care Planning with patient and/or family member.  Education was provided including the importance of the Health Care Power of , Advance Directives, and/or [place] documentation.  The patient expressed understanding to the importance of this information and discussion.  Length of ACP conversation in minutes: 1       Opioid Screening: Patient medication list reviewed, patient is not taking prescription opioids. Patient is not using additional opioids than prescribed. Patient is at low risk of substance abuse based on this opioid use history.     No follow-ups on file. In addition to their scheduled follow up, the patient has also been  instructed to follow up on as needed basis.

## 2024-02-08 ENCOUNTER — TELEPHONE (OUTPATIENT)
Dept: INTERNAL MEDICINE | Facility: CLINIC | Age: 59
End: 2024-02-08
Payer: MEDICARE

## 2024-02-08 DIAGNOSIS — M54.9 BACK PAIN, UNSPECIFIED BACK LOCATION, UNSPECIFIED BACK PAIN LATERALITY, UNSPECIFIED CHRONICITY: Primary | ICD-10-CM

## 2024-02-08 RX ORDER — TIZANIDINE 4 MG/1
4 TABLET ORAL 2 TIMES DAILY PRN
Qty: 14 TABLET | Refills: 0 | Status: SHIPPED | OUTPATIENT
Start: 2024-02-08 | End: 2024-02-15

## 2024-02-08 NOTE — TELEPHONE ENCOUNTER
----- Message from [facility] sent at 2/8/2024  1:08 PM CST -----  .Type:  Needs Medical Advice    Who Called:  pt    Symptoms (please be specific):  middle lower back pain can't twist hips , hurt when he walks, hands swollen in the AM      How long has patient had these symptoms:   2 weeks    Pharmacy name and phone #:   [facility] on [address]    Would the patient rather a call back or a response via [facility]?      Best Call Back Number:  [phone]    Additional Information:  pt has been going through this for 2 weeks he need some help please advise thanks

## 2024-02-08 NOTE — TELEPHONE ENCOUNTER
Lower back hurts when he sits and stands after sitting. Picked up two cases of water two weeks ago. Not interested in pain medicine but possibly a muscle relaxer?     Spoke with him about bilateral hand swelling. He stopped fluid pills d/t issues with dehydration, they do get better through out the day I explained this is  normal and not to worry especially if swelling goes away after a few minutes, which it does. He startes moving around and once he urinates the swelling gets better.

## 2024-02-26 ENCOUNTER — TELEPHONE (OUTPATIENT)
Dept: INTERNAL MEDICINE | Facility: CLINIC | Age: 59
End: 2024-02-26
Payer: MEDICARE

## 2024-02-26 DIAGNOSIS — E11.9 TYPE 2 DIABETES MELLITUS WITHOUT COMPLICATION, WITHOUT LONG-TERM CURRENT USE OF INSULIN: Primary | ICD-10-CM

## 2024-02-26 RX ORDER — LANCETS
EACH MISCELLANEOUS
Qty: 50 EACH | Refills: 3 | Status: SHIPPED | OUTPATIENT
Start: 2024-02-26

## 2024-02-26 RX ORDER — INSULIN PUMP SYRINGE, 3 ML
EACH MISCELLANEOUS
Qty: 1 EACH | Refills: 0 | Status: SHIPPED | OUTPATIENT
Start: 2024-02-26 | End: 2025-02-25

## 2024-02-26 NOTE — TELEPHONE ENCOUNTER
KWABENA   Spoke with patient at this time. Had chills and weakness this morning at 2 am. He got up and ate a nutri grain bar and drank some juice because he thought it was his sugar. He felt better afterwards. He does not have a glucometer to check his sugar so I sent one to his pharmacy. Told to call with any questions/concerns.

## 2024-02-26 NOTE — TELEPHONE ENCOUNTER
----- Message from Breanna Carlin sent at 2/26/2024  8:50 AM CST -----  .Type:  Needs Medical Advice    Who Called: Sharan  Symptoms (please be specific): No fever, chills cold and sweat   How long has patient had these symptoms:  Last Night  Pharmacy name and phone #:  [facility] [address]  Would the patient rather a call back or a response via [facility]?   Best Call Back Number: [phone]  Additional Information: Please call something in and please call back

## 2024-02-27 ENCOUNTER — TELEPHONE (OUTPATIENT)
Dept: INTERNAL MEDICINE | Facility: CLINIC | Age: 59
End: 2024-02-27
Payer: MEDICARE

## 2024-02-27 NOTE — TELEPHONE ENCOUNTER
----- Message from Cristiano Krueger sent at 2/27/2024  7:56 AM CST -----  .Type:  Needs Medical Advice    Who Called: pt  Symptoms (please be specific): blood sugar   How long has patient had these symptoms:    Pharmacy name and phone #:    Would the patient rather a call back or a response via [facility]? Call back  Best Call Back Number: [phone]  Additional Information: pt is wanting to speak with nurse about blood sugar levels at 120 fasting

## 2024-02-27 NOTE — TELEPHONE ENCOUNTER
Spoke with patient at this time. Explained this is normal for non diabetic. Patient verbalized understanding.

## 2024-04-04 ENCOUNTER — TELEPHONE (OUTPATIENT)
Dept: INTERNAL MEDICINE | Facility: CLINIC | Age: 59
End: 2024-04-04
Payer: MEDICARE

## 2024-04-04 DIAGNOSIS — Z00.00 WELLNESS EXAMINATION: Primary | ICD-10-CM

## 2024-04-04 RX ORDER — FAMOTIDINE 40 MG/1
40 TABLET, FILM COATED ORAL NIGHTLY
Qty: 30 TABLET | Refills: 11 | Status: SHIPPED | OUTPATIENT
Start: 2024-04-04 | End: 2024-04-16 | Stop reason: SDUPTHER

## 2024-04-04 NOTE — TELEPHONE ENCOUNTER
For about 2 weeks has a sour stomach and belches after. Tried baking powder, tums, pepto, everything he can think of. Had colonoscopy with dr logan in 2022. KWABENA

## 2024-04-04 NOTE — TELEPHONE ENCOUNTER
----- Message from Breanna Carlin sent at 4/4/2024  1:17 PM CDT -----  .Type:  Needs Medical Advice    Who Called: pt  Symptoms (please be specific): Sour Stomach   How long has patient had these symptoms:  2/weeks  Pharmacy name and phone #:  [facility] on [address]  Would the patient rather a call back or a response via [facility]?   Best Call Back Number: [phone]  Additional Information: Please call something in

## 2024-04-08 ENCOUNTER — TELEPHONE (OUTPATIENT)
Dept: INTERNAL MEDICINE | Facility: CLINIC | Age: 59
End: 2024-04-08
Payer: MEDICARE

## 2024-04-08 NOTE — TELEPHONE ENCOUNTER
Protonix in am and pepcid q pm. Sleep well and no issues at night not waking up anymore with sour stomach, but has problems mid morning after protonix. Can he take pepcid bid with protonix?     2.   Had sugar readings in the 50's three times this weekend. 58, 54, and 59. Had to take glucose tablets but they did help. He actually gained weight at 205. Not on any medications.

## 2024-04-08 NOTE — TELEPHONE ENCOUNTER
----- Message from Felice Diaz sent at 4/8/2024 10:47 AM CDT -----  Type:  Needs Medical Advice    Who Called: pt     Pharmacy name and phone #:  [facility] [address] [place], [place]  [facility] Call Back Number: [phone]    Additional Information: pt is still having issues with upset stomach during the day. famotidine (PEPCID) 40 MG tablet, is taken at night. Pt would like to discuss increase dose to help during day. Also, Blood sugar was below 60 on Friday, Saturday and Sunday , would like a call to discuss

## 2024-04-08 NOTE — TELEPHONE ENCOUNTER
Yes, he can take Pepcid in the morning as needed with his Protonix    The treatment for hypoglycemia is to avoid high glucose foods.  Instead, have frequent protein rich snacks

## 2024-04-08 NOTE — TELEPHONE ENCOUNTER
----- Message from Felice Diaz sent at 4/8/2024 10:47 AM CDT -----  Type:  Needs Medical Advice    Who Called: pt     Pharmacy name and phone #:  [facility] [address] [place], [place]  [facility] Call Back Number: [phone]    Additional Information: pt is still having issues with upset stomach during the day. famotidine (PEPCID) 40 MG tablet, is taken at night. Pt would like to discuss increase dose to help during day. Also, Blood sugar was below 60 on Friday, Saturday and Sunday , would like a call to discuss

## 2024-04-16 ENCOUNTER — TELEPHONE (OUTPATIENT)
Dept: INTERNAL MEDICINE | Facility: CLINIC | Age: 59
End: 2024-04-16
Payer: MEDICARE

## 2024-04-16 DIAGNOSIS — Z00.00 WELLNESS EXAMINATION: ICD-10-CM

## 2024-04-16 RX ORDER — FAMOTIDINE 40 MG/1
40 TABLET, FILM COATED ORAL 2 TIMES DAILY
Qty: 60 TABLET | Refills: 11 | Status: SHIPPED | OUTPATIENT
Start: 2024-04-16

## 2024-04-16 NOTE — TELEPHONE ENCOUNTER
----- Message from Breanna Carlin sent at 4/16/2024  2:26 PM CDT -----  .Type:  Patient Returning Call    Who Called:PT  Who Left Message for Patient:PT  Does the patient know what this is regarding?:call back  Would the patient rather a call back or a response via [facility]?   Best Call Back Number:[phone]  Additional Information: call back

## 2024-04-16 NOTE — TELEPHONE ENCOUNTER
----- Message from Breanna Carlin sent at 4/16/2024  2:26 PM CDT -----  .Type:  Patient Returning Call    Who Called:PT  Who Left Message for Patient:PT  Does the patient know what this is regarding?:call back  Would the patient rather a call back or a response via [facility]?   Best Call Back Number:[phone]  Additional Information: call back

## 2024-04-16 NOTE — TELEPHONE ENCOUNTER
Spoke to patient the Famotidine taking twice a day is helping would like a refill. Refill sent at this time

## 2024-04-16 NOTE — TELEPHONE ENCOUNTER
----- Message from Cristiano Krueger sent at 4/15/2024  4:10 PM CDT -----  .Type:  Needs Medical Advice    Who Called: pt  Symptoms (please be specific):    How long has patient had these symptoms:    Pharmacy name and phone #:    Would the patient rather a call back or a response via [facility]? Call back  Best Call Back Number: [phone]  Additional Information: pt called wanting to speak with nurse to discuss current medication list, famotidine (PEPCID) 40 MG tablet

## 2024-05-22 ENCOUNTER — PATIENT MESSAGE (OUTPATIENT)
Dept: ADMINISTRATIVE | Facility: HOSPITAL | Age: 59
End: 2024-05-22
Payer: MEDICARE

## 2024-05-23 ENCOUNTER — DOCUMENTATION ONLY (OUTPATIENT)
Dept: INTERNAL MEDICINE | Facility: CLINIC | Age: 59
End: 2024-05-23
Payer: MEDICARE

## 2024-05-23 ENCOUNTER — TELEPHONE (OUTPATIENT)
Dept: INTERNAL MEDICINE | Facility: CLINIC | Age: 59
End: 2024-05-23
Payer: MEDICARE

## 2024-05-23 NOTE — TELEPHONE ENCOUNTER
----- Message from Sheree Ohara sent at 5/23/2024 10:28 AM CDT -----  Regarding: referral  Type:  Patient Requesting Referral    Who Called:pt    Referral to What Specialty:gi  Reason for Referral:colonoscopy  Does the patient want the referral with a specific physician?:    Would the patient rather a call back or a response via [facility]? C/b  Best Call Back Number:[phone]    Additional Information: pt would like a c/b to find out when last colonoscopy was

## 2024-05-23 NOTE — TELEPHONE ENCOUNTER
Spoke with patient at this time. Explained last colonoscopy was 6/22/22. Will update in system as he received an email saying he was overdue. Not due until 2027. Verbalized understanding.

## 2024-05-31 ENCOUNTER — PATIENT OUTREACH (OUTPATIENT)
Facility: CLINIC | Age: 59
End: 2024-05-31
Payer: MEDICARE

## 2024-05-31 NOTE — PROGRESS NOTES
Health Maintenance Topic(s) Outreach Outcomes & Actions Taken:    Eye Exam - Outreach Outcomes & Actions Taken  : message sent to patient to find out if he has an appt scheduled with Jignesh, If he needs referral, or if he would like to do in office exam during pcp appt in July.       Additional Notes:

## 2024-06-02 DIAGNOSIS — E78.49 OTHER HYPERLIPIDEMIA: ICD-10-CM

## 2024-06-03 RX ORDER — LINACLOTIDE 290 UG/1
CAPSULE, GELATIN COATED ORAL
Qty: 30 CAPSULE | Refills: 0 | Status: SHIPPED | OUTPATIENT
Start: 2024-06-03

## 2024-06-29 DIAGNOSIS — E78.49 OTHER HYPERLIPIDEMIA: ICD-10-CM

## 2024-07-03 ENCOUNTER — TELEPHONE (OUTPATIENT)
Dept: INTERNAL MEDICINE | Facility: CLINIC | Age: 59
End: 2024-07-03
Payer: MEDICARE

## 2024-07-08 ENCOUNTER — LAB VISIT (OUTPATIENT)
Dept: LAB | Facility: HOSPITAL | Age: 59
End: 2024-07-08
Attending: INTERNAL MEDICINE
Payer: MEDICARE

## 2024-07-08 DIAGNOSIS — F31.9 BIPOLAR AFFECTIVE DISORDER, REMISSION STATUS UNSPECIFIED: ICD-10-CM

## 2024-07-08 DIAGNOSIS — R73.01 IMPAIRED FASTING GLUCOSE: ICD-10-CM

## 2024-07-08 DIAGNOSIS — E78.49 OTHER HYPERLIPIDEMIA: ICD-10-CM

## 2024-07-08 DIAGNOSIS — F20.89 OTHER SCHIZOPHRENIA: ICD-10-CM

## 2024-07-08 DIAGNOSIS — Z12.5 PROSTATE CANCER SCREENING: ICD-10-CM

## 2024-07-08 DIAGNOSIS — Z00.00 WELLNESS EXAMINATION: ICD-10-CM

## 2024-07-08 LAB
ALBUMIN SERPL-MCNC: 4.3 G/DL (ref 3.4–4.8)
ALBUMIN/GLOB SERPL: 1.7 RATIO (ref 1.1–2)
ALP SERPL-CCNC: 42 UNIT/L (ref 40–150)
ALT SERPL-CCNC: 17 UNIT/L (ref 0–55)
ANION GAP SERPL CALC-SCNC: 10 MEQ/L
AST SERPL-CCNC: 21 UNIT/L (ref 5–34)
BACTERIA #/AREA URNS AUTO: NORMAL /HPF
BASOPHILS # BLD AUTO: 0.08 X10(3)/MCL
BASOPHILS NFR BLD AUTO: 0.9 %
BILIRUB SERPL-MCNC: 0.5 MG/DL
BILIRUB UR QL STRIP.AUTO: NEGATIVE
BUN SERPL-MCNC: 21.7 MG/DL (ref 8.4–25.7)
CALCIUM SERPL-MCNC: 10 MG/DL (ref 8.8–10)
CHLORIDE SERPL-SCNC: 107 MMOL/L (ref 98–107)
CHOLEST SERPL-MCNC: 102 MG/DL
CHOLEST/HDLC SERPL: 3 {RATIO} (ref 0–5)
CLARITY UR: CLEAR
CO2 SERPL-SCNC: 24 MMOL/L (ref 23–31)
COLOR UR AUTO: NORMAL
CREAT SERPL-MCNC: 1.39 MG/DL (ref 0.73–1.18)
CREAT UR-MCNC: 108.4 MG/DL (ref 63–166)
CREAT/UREA NIT SERPL: 16
EOSINOPHIL # BLD AUTO: 0.29 X10(3)/MCL (ref 0–0.9)
EOSINOPHIL NFR BLD AUTO: 3.2 %
ERYTHROCYTE [DISTWIDTH] IN BLOOD BY AUTOMATED COUNT: 14.3 % (ref 11.5–17)
EST. AVERAGE GLUCOSE BLD GHB EST-MCNC: 111.2 MG/DL
GFR SERPLBLD CREATININE-BSD FMLA CKD-EPI: 58 ML/MIN/1.73/M2
GLOBULIN SER-MCNC: 2.6 GM/DL (ref 2.4–3.5)
GLUCOSE SERPL-MCNC: 88 MG/DL (ref 82–115)
GLUCOSE UR QL STRIP: NORMAL
HBA1C MFR BLD: 5.5 %
HCT VFR BLD AUTO: 47.7 % (ref 42–52)
HDLC SERPL-MCNC: 31 MG/DL (ref 35–60)
HGB BLD-MCNC: 15.6 G/DL (ref 14–18)
HGB UR QL STRIP: NEGATIVE
IMM GRANULOCYTES # BLD AUTO: 0.05 X10(3)/MCL (ref 0–0.04)
IMM GRANULOCYTES NFR BLD AUTO: 0.6 %
KETONES UR QL STRIP: NEGATIVE
LDLC SERPL CALC-MCNC: 52 MG/DL (ref 50–140)
LEUKOCYTE ESTERASE UR QL STRIP: NEGATIVE
LYMPHOCYTES # BLD AUTO: 3.35 X10(3)/MCL (ref 0.6–4.6)
LYMPHOCYTES NFR BLD AUTO: 37.3 %
MCH RBC QN AUTO: 31.6 PG (ref 27–31)
MCHC RBC AUTO-ENTMCNC: 32.7 G/DL (ref 33–36)
MCV RBC AUTO: 96.8 FL (ref 80–94)
MICROALBUMIN UR-MCNC: <5 UG/ML
MICROALBUMIN/CREAT RATIO PNL UR: NORMAL
MONOCYTES # BLD AUTO: 0.99 X10(3)/MCL (ref 0.1–1.3)
MONOCYTES NFR BLD AUTO: 11 %
NEUTROPHILS # BLD AUTO: 4.22 X10(3)/MCL (ref 2.1–9.2)
NEUTROPHILS NFR BLD AUTO: 47 %
NITRITE UR QL STRIP: NEGATIVE
NRBC BLD AUTO-RTO: 0 %
PH UR STRIP: 7 [PH]
PLATELET # BLD AUTO: 207 X10(3)/MCL (ref 130–400)
PMV BLD AUTO: 11.4 FL (ref 7.4–10.4)
POTASSIUM SERPL-SCNC: 4.3 MMOL/L (ref 3.5–5.1)
PROT SERPL-MCNC: 6.9 GM/DL (ref 5.8–7.6)
PROT UR QL STRIP: NEGATIVE
RBC # BLD AUTO: 4.93 X10(6)/MCL (ref 4.7–6.1)
RBC #/AREA URNS AUTO: NORMAL /HPF
SODIUM SERPL-SCNC: 141 MMOL/L (ref 136–145)
SP GR UR STRIP.AUTO: 1.02 (ref 1–1.03)
SQUAMOUS #/AREA URNS LPF: NORMAL /HPF
TRIGL SERPL-MCNC: 95 MG/DL (ref 34–140)
TSH SERPL-ACNC: 2.25 UIU/ML (ref 0.35–4.94)
UROBILINOGEN UR STRIP-ACNC: NORMAL
VLDLC SERPL CALC-MCNC: 19 MG/DL
WBC # BLD AUTO: 8.98 X10(3)/MCL (ref 4.5–11.5)
WBC #/AREA URNS AUTO: NORMAL /HPF

## 2024-07-08 PROCEDURE — 81015 MICROSCOPIC EXAM OF URINE: CPT

## 2024-07-08 PROCEDURE — 85025 COMPLETE CBC W/AUTO DIFF WBC: CPT

## 2024-07-08 PROCEDURE — 36415 COLL VENOUS BLD VENIPUNCTURE: CPT

## 2024-07-08 PROCEDURE — 83036 HEMOGLOBIN GLYCOSYLATED A1C: CPT

## 2024-07-08 PROCEDURE — 80053 COMPREHEN METABOLIC PANEL: CPT

## 2024-07-08 PROCEDURE — 84443 ASSAY THYROID STIM HORMONE: CPT

## 2024-07-08 PROCEDURE — 82570 ASSAY OF URINE CREATININE: CPT

## 2024-07-08 PROCEDURE — 81001 URINALYSIS AUTO W/SCOPE: CPT

## 2024-07-08 PROCEDURE — 80061 LIPID PANEL: CPT

## 2024-07-11 NOTE — PROGRESS NOTES
"A for select Subjective:       Patient ID: Sharan Cadena is a 58 y.o. male.      Patient Care Team:  Hamilton Tyler II, MD as PCP - General (Internal Medicine)    Chief Complaint: Hyperlipidemia, Impaired Fasting Glucose, Sleep Apnea, Schizophrenia, and Back Pain    58-year-old male seen today for followup of schizophrenia, bipolar disorder, chronic neck and back pain, anxiety, sleep apnea, tobacco use, chronic rhinosinusitis, and peptic ulcer disease.        Review of Systems   Constitutional:  Negative for fever.   HENT:  Negative for nosebleeds.    Eyes:  Negative for visual disturbance.   Respiratory:  Negative for shortness of breath.    Cardiovascular:  Negative for chest pain.   Gastrointestinal:  Negative for abdominal pain.   Genitourinary:  Negative for dysuria.   Musculoskeletal:  Negative for gait problem.   Neurological:  Negative for headaches.           Patient Reported Health Risk Assessment         Objective:      Physical Exam  HENT:      Head: Normocephalic.      Mouth/Throat:      Pharynx: Oropharynx is clear.   Eyes:      Extraocular Movements: Extraocular movements intact.   Cardiovascular:      Rate and Rhythm: Normal rate and regular rhythm.   Pulmonary:      Breath sounds: Normal breath sounds.   Abdominal:      Palpations: Abdomen is soft.   Musculoskeletal:         General: No swelling.   Skin:     General: Skin is warm.   Neurological:      General: No focal deficit present.      Mental Status: He is alert and oriented to person, place, and time.   Psychiatric:         Mood and Affect: Mood normal.         Vitals:    07/12/24 0859   BP: 118/76   Pulse: 72   Resp: 16   Temp: 98.4 °F (36.9 °C)   SpO2: 98%   Weight: 97.1 kg (214 lb)   Height: 5' 10" (1.778 m)                     No data to display                  7/12/2024     9:15 AM 1/10/2024     9:45 AM 11/14/2023     2:15 PM 7/12/2023    10:15 AM 5/15/2023    11:00 AM 1/6/2023     9:45 AM   Fall Risk Assessment - Outpatient "   Mobility Status Ambulatory Ambulatory Ambulatory Ambulatory Ambulatory Ambulatory   Number of falls 0 0 0 0 0 0   Identified as fall risk False False False False False False                  Assessment:       Problem List Items Addressed This Visit          Psychiatric    Bipolar disorder - Primary    Relevant Orders    CBC Auto Differential    Comprehensive Metabolic Panel    Lipid Panel    Microalbumin/Creatinine Ratio, Urine    Urinalysis, Reflex to Urine Culture    TSH    Hemoglobin A1C    Schizophrenia    Relevant Orders    CBC Auto Differential    Comprehensive Metabolic Panel    Lipid Panel    Microalbumin/Creatinine Ratio, Urine    Urinalysis, Reflex to Urine Culture    TSH    Hemoglobin A1C       Cardiac/Vascular    Other hyperlipidemia    Relevant Orders    CBC Auto Differential    Comprehensive Metabolic Panel    Lipid Panel    Microalbumin/Creatinine Ratio, Urine    Urinalysis, Reflex to Urine Culture    TSH    Hemoglobin A1C       Renal/    Prostate cancer screening    Relevant Orders    CBC Auto Differential    Comprehensive Metabolic Panel    Lipid Panel    Microalbumin/Creatinine Ratio, Urine    Urinalysis, Reflex to Urine Culture    TSH    Hemoglobin A1C       Endocrine    Impaired fasting glucose    Relevant Orders    CBC Auto Differential    Comprehensive Metabolic Panel    Lipid Panel    Microalbumin/Creatinine Ratio, Urine    Urinalysis, Reflex to Urine Culture    TSH    Hemoglobin A1C       Other    RESOLVED: Wellness examination    Relevant Orders    CBC Auto Differential    Comprehensive Metabolic Panel    Lipid Panel    Microalbumin/Creatinine Ratio, Urine    Urinalysis, Reflex to Urine Culture    TSH    Hemoglobin A1C         Medication List with Changes/Refills   Current Medications    ALPRAZOLAM (XANAX) 2 MG TAB    Take 2 mg by mouth 2 (two) times daily. as needed for anxiety.    ATORVASTATIN (LIPITOR) 40 MG TABLET    Take 40 mg by mouth every evening.    BENZTROPINE (COGENTIN) 2 MG  TAB    Take 2 mg by mouth 3 (three) times daily.    BLOOD SUGAR DIAGNOSTIC STRP    To check BG 1 times daily, to use with insurance preferred meter    BLOOD-GLUCOSE METER KIT    To check BG 1 times daily, to use with insurance preferred meter    FAMOTIDINE (PEPCID) 40 MG TABLET    Take 1 tablet (40 mg total) by mouth 2 (two) times daily.    FENOFIBRATE (TRICOR) 145 MG TABLET    Take 145 mg by mouth once daily.    FEXOFENADINE (ALLEGRA) 180 MG TABLET    Take 180 mg by mouth once daily.    FLUNISOLIDE 25 MCG, 0.025%, (NASALIDE) 25 MCG (0.025 %) SPRY    2 sprays by Nasal route 2 (two) times daily.    LANCETS MISC    To check BG 1 times daily, to use with insurance preferred meter    LINACLOTIDE (LINZESS) 290 MCG CAP CAPSULE    Take 1 capsule (290 mcg total) by mouth before breakfast.    METOPROLOL SUCCINATE (TOPROL-XL) 25 MG 24 HR TABLET    Take 25 mg by mouth once daily.    OLANZAPINE (ZYPREXA) 15 MG TAB    Take 1 tablet by mouth Daily.    OXCARBAZEPINE (TRILEPTAL) 150 MG TAB    Take 150 mg by mouth 2 (two) times a day.    PANTOPRAZOLE (PROTONIX) 40 MG TABLET    Take 1 tablet (40 mg total) by mouth once daily.    QUETIAPINE (SEROQUEL XR) 300 MG TB24    Take 2 tablets by mouth every evening.   Discontinued Medications    ALPRAZOLAM (XANAX) 1 MG TABLET    Take 1 tablet by mouth in the evening    ASPIRIN (ECOTRIN) 81 MG EC TABLET    Take 1 tablet (81 mg total) by mouth once daily.    PLAVIX 75 MG TABLET    Take 75 mg by mouth once daily.        Plan:       1. Schizophrenia: He is seen by Dr. Thorne and his nurse practitioner regularly.      2. Chronic neck and back pain: Stable. He underwent sacroiliac joint fusion with Dr. Aldo Conway in 2019 with good results.     3. Sleep apnea: Continue CPAP.      4. Tobacco use: He was smoking 1 pack per day and is down to a 1/2 PPD, trying to cut back; tried Chantix in 2023     5. Peptic ulcer disease:  Continue pantoprazole, chest pain resolved.  Working very well     6. Chronic  rhinosinusitis: He also has asthma. Because of ongoing cough, referred back to ENT and was given medication for allergies; Flonase was started     7. Hyperlipidemia: He is followed by cardiology. Continue atorvastatin     8. Impaired fasting glucose: A1c 5.5.  He stopped metformin in 2022 because of weight loss.  Stopped Ozempic in 2023 because it was not helping with weight loss.  Diabetes has resolved    9. Edema: Lasix as needed     10. Asthma: Stable. Pneumovax 2019     11.  Chronic constipation: He has tried over-the-counter laxatives. SBO in 2021. Linzess working well     12. Recurrent nephrolithiasis: Last episode 5/2023, Dr. Silas Ramírez following    13. Wellness: Colonoscopy in 2022 with polyps, every 5 years. 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 Only plays at home now                 Medicare Annual Wellness and Personalized Prevention Plan:   Fall Risk + Home Safety + Hearing Impairment + Depression Screen + Cognitive Impairment Screen + Health Risk Assessment all reviewed    Health Maintenance Topics with due status: Not Due       Topic Last Completion Date    TETANUS VACCINE 04/26/2019    Colorectal Cancer Screening 06/22/2022    Influenza Vaccine 08/07/2023    Diabetes Urine Screening 07/08/2024    Lipid Panel 07/08/2024    Hemoglobin A1c 07/08/2024    Low Dose Statin 07/11/2024      The patient's Health Maintenance was reviewed and the following appears to be due at this time:   Health Maintenance Due   Topic Date Due    Hepatitis C Screening  Never done    Foot Exam  Never done    HIV Screening  Never done    Shingles Vaccine (3 of 3) 09/14/2019    RSV Vaccine (Age 60+ and Pregnant patients) (1 - 1-dose 60+ series) Never done    Eye Exam  02/02/2024       Advance Care Planning   I attest to discussing Advance Care Planning with patient and/or family member.  Education was provided including the importance of the Health Care  Power of , Advance Directives, and/or [place] documentation.  The patient expressed understanding to the importance of this information and discussion.  Length of ACP conversation in minutes: 0       Opioid Screening: Patient medication list reviewed, patient is not taking prescription opioids. Patient is not using additional opioids than prescribed. Patient is at low risk of substance abuse based on this opioid use history.     No follow-ups on file. In addition to their scheduled follow up, the patient has also been instructed to follow up on as needed basis.

## 2024-07-12 ENCOUNTER — OFFICE VISIT (OUTPATIENT)
Dept: INTERNAL MEDICINE | Facility: CLINIC | Age: 59
End: 2024-07-12
Payer: MEDICARE

## 2024-07-12 VITALS
HEIGHT: 70 IN | TEMPERATURE: 98 F | BODY MASS INDEX: 30.64 KG/M2 | HEART RATE: 72 BPM | WEIGHT: 214 LBS | SYSTOLIC BLOOD PRESSURE: 118 MMHG | RESPIRATION RATE: 16 BRPM | DIASTOLIC BLOOD PRESSURE: 76 MMHG | OXYGEN SATURATION: 98 %

## 2024-07-12 DIAGNOSIS — E78.49 OTHER HYPERLIPIDEMIA: ICD-10-CM

## 2024-07-12 DIAGNOSIS — Z12.5 PROSTATE CANCER SCREENING: ICD-10-CM

## 2024-07-12 DIAGNOSIS — R73.01 IMPAIRED FASTING GLUCOSE: ICD-10-CM

## 2024-07-12 DIAGNOSIS — Z00.00 WELLNESS EXAMINATION: ICD-10-CM

## 2024-07-12 DIAGNOSIS — F31.9 BIPOLAR AFFECTIVE DISORDER, REMISSION STATUS UNSPECIFIED: Primary | ICD-10-CM

## 2024-07-12 DIAGNOSIS — F20.3 UNDIFFERENTIATED SCHIZOPHRENIA: ICD-10-CM

## 2024-07-12 RX ORDER — ALPRAZOLAM 2 MG/1
2 TABLET ORAL 2 TIMES DAILY
COMMUNITY
Start: 2024-05-08

## 2024-09-24 DIAGNOSIS — Z00.00 WELLNESS EXAMINATION: Primary | ICD-10-CM

## 2024-09-24 RX ORDER — PANTOPRAZOLE SODIUM 40 MG/1
40 TABLET, DELAYED RELEASE ORAL DAILY
Qty: 90 TABLET | Refills: 3 | Status: SHIPPED | OUTPATIENT
Start: 2024-09-24 | End: 2025-09-24

## 2024-10-17 ENCOUNTER — LAB VISIT (OUTPATIENT)
Dept: LAB | Facility: HOSPITAL | Age: 59
End: 2024-10-17
Attending: PHYSICIAN ASSISTANT
Payer: MEDICARE

## 2024-10-17 DIAGNOSIS — R11.0 NAUSEA: ICD-10-CM

## 2024-10-17 DIAGNOSIS — Z87.19 PERSONAL HISTORY OF UNSPECIFIED DIGESTIVE DISEASE: ICD-10-CM

## 2024-10-17 DIAGNOSIS — K59.09 OTHER CONSTIPATION: ICD-10-CM

## 2024-10-17 DIAGNOSIS — R14.0 GASTRIC TYMPANY: Primary | ICD-10-CM

## 2024-10-17 LAB
ALBUMIN SERPL-MCNC: 4.2 G/DL (ref 3.4–4.8)
ALBUMIN/GLOB SERPL: 1.6 RATIO (ref 1.1–2)
ALP SERPL-CCNC: 43 UNIT/L (ref 40–150)
ALT SERPL-CCNC: 21 UNIT/L (ref 0–55)
ANION GAP SERPL CALC-SCNC: 9 MEQ/L
AST SERPL-CCNC: 25 UNIT/L (ref 5–34)
BASOPHILS # BLD AUTO: 0.08 X10(3)/MCL
BASOPHILS NFR BLD AUTO: 0.9 %
BILIRUB SERPL-MCNC: 0.5 MG/DL
BUN SERPL-MCNC: 21.2 MG/DL (ref 8.4–25.7)
CALCIUM SERPL-MCNC: 9.9 MG/DL (ref 8.8–10)
CHLORIDE SERPL-SCNC: 110 MMOL/L (ref 98–107)
CO2 SERPL-SCNC: 24 MMOL/L (ref 23–31)
CREAT SERPL-MCNC: 1.42 MG/DL (ref 0.72–1.25)
CREAT/UREA NIT SERPL: 15
EOSINOPHIL # BLD AUTO: 0.27 X10(3)/MCL (ref 0–0.9)
EOSINOPHIL NFR BLD AUTO: 3 %
ERYTHROCYTE [DISTWIDTH] IN BLOOD BY AUTOMATED COUNT: 14.8 % (ref 11.5–17)
GFR SERPLBLD CREATININE-BSD FMLA CKD-EPI: 56 ML/MIN/1.73/M2
GLOBULIN SER-MCNC: 2.6 GM/DL (ref 2.4–3.5)
GLUCOSE SERPL-MCNC: 82 MG/DL (ref 82–115)
HCT VFR BLD AUTO: 43.3 % (ref 42–52)
HGB BLD-MCNC: 14.3 G/DL (ref 14–18)
IMM GRANULOCYTES # BLD AUTO: 0.04 X10(3)/MCL (ref 0–0.04)
IMM GRANULOCYTES NFR BLD AUTO: 0.4 %
LYMPHOCYTES # BLD AUTO: 2.51 X10(3)/MCL (ref 0.6–4.6)
LYMPHOCYTES NFR BLD AUTO: 28.2 %
MCH RBC QN AUTO: 31.9 PG (ref 27–31)
MCHC RBC AUTO-ENTMCNC: 33 G/DL (ref 33–36)
MCV RBC AUTO: 96.7 FL (ref 80–94)
MONOCYTES # BLD AUTO: 0.7 X10(3)/MCL (ref 0.1–1.3)
MONOCYTES NFR BLD AUTO: 7.9 %
NEUTROPHILS # BLD AUTO: 5.29 X10(3)/MCL (ref 2.1–9.2)
NEUTROPHILS NFR BLD AUTO: 59.6 %
NRBC BLD AUTO-RTO: 0 %
PLATELET # BLD AUTO: 186 X10(3)/MCL (ref 130–400)
PMV BLD AUTO: 11.2 FL (ref 7.4–10.4)
POTASSIUM SERPL-SCNC: 4.4 MMOL/L (ref 3.5–5.1)
PROT SERPL-MCNC: 6.8 GM/DL (ref 5.8–7.6)
RBC # BLD AUTO: 4.48 X10(6)/MCL (ref 4.7–6.1)
SODIUM SERPL-SCNC: 143 MMOL/L (ref 136–145)
WBC # BLD AUTO: 8.89 X10(3)/MCL (ref 4.5–11.5)

## 2024-10-17 PROCEDURE — 36415 COLL VENOUS BLD VENIPUNCTURE: CPT

## 2024-10-17 PROCEDURE — 80053 COMPREHEN METABOLIC PANEL: CPT

## 2024-10-17 PROCEDURE — 85025 COMPLETE CBC W/AUTO DIFF WBC: CPT

## 2024-10-22 DIAGNOSIS — K29.00 ACUTE GASTRITIS WITHOUT BLEEDING: Primary | ICD-10-CM

## 2024-10-22 DIAGNOSIS — R10.13 ABDOMINAL PAIN, EPIGASTRIC: ICD-10-CM

## 2024-10-22 DIAGNOSIS — R14.0 ABDOMINAL DISTENSION: ICD-10-CM

## 2024-10-30 ENCOUNTER — HOSPITAL ENCOUNTER (OUTPATIENT)
Dept: RADIOLOGY | Facility: HOSPITAL | Age: 59
Discharge: HOME | End: 2024-10-30
Attending: PHYSICIAN ASSISTANT
Payer: MEDICARE

## 2024-10-30 DIAGNOSIS — R14.0 ABDOMINAL DISTENSION: ICD-10-CM

## 2024-10-30 DIAGNOSIS — K29.00 ACUTE GASTRITIS WITHOUT BLEEDING: ICD-10-CM

## 2024-10-30 DIAGNOSIS — R10.13 ABDOMINAL PAIN, EPIGASTRIC: ICD-10-CM

## 2024-10-30 PROCEDURE — 74248 X-RAY SM INT F-THRU STD: CPT | Mod: TC

## 2024-10-30 PROCEDURE — 74240 X-RAY XM UPR GI TRC 1CNTRST: CPT | Mod: TC

## 2024-10-30 PROCEDURE — 25500020 PHARM REV CODE 255: Performed by: PHYSICIAN ASSISTANT

## 2024-10-30 PROCEDURE — A9698 NON-RAD CONTRAST MATERIALNOC: HCPCS | Performed by: PHYSICIAN ASSISTANT

## 2024-10-30 RX ADMIN — BARIUM SULFATE 355 ML: 0.6 SUSPENSION ORAL at 11:10

## 2024-10-30 RX ADMIN — BARIUM SULFATE 50 ML: 980 POWDER, FOR SUSPENSION ORAL at 11:10

## 2024-11-07 ENCOUNTER — TELEPHONE (OUTPATIENT)
Dept: INTERNAL MEDICINE | Facility: CLINIC | Age: 59
End: 2024-11-07
Payer: MEDICARE

## 2024-11-07 NOTE — TELEPHONE ENCOUNTER
----- Message from Skylar sent at 11/7/2024 11:20 AM CST -----  Regarding: med refill  .Who Called: Sharan Cadena    Refill or New Rx:Refill  RX Name and Strength:flunisolide 25 mcg, 0.025%, (NASALIDE) 25 mcg (0.025 %) Spry  How is the patient currently taking it? (ex. 1XDay):  Is this a 30 day or 90 day RX:  Local or Mail Order:local  List of preferred pharmacies on file (remove unneeded): [facility] [address] [place] [address]   Phone: [phone]  Fax: [phone]        Ordering Provider:Jayson      Preferred Method of Contact: Phone Call  Patient's Preferred Phone Number on File: [phone]   Best Call Back Number, if different:  Additional Information: pt needs authorization from dr to refill prescription

## 2024-11-08 DIAGNOSIS — H53.8 OTHER VISUAL DISTURBANCES: Primary | ICD-10-CM

## 2024-11-11 ENCOUNTER — TELEPHONE (OUTPATIENT)
Dept: INTERNAL MEDICINE | Facility: CLINIC | Age: 59
End: 2024-11-11
Payer: MEDICARE

## 2024-11-11 DIAGNOSIS — Z00.00 WELLNESS EXAMINATION: Primary | ICD-10-CM

## 2024-11-11 RX ORDER — FLUNISOLIDE 0.25 MG/ML
2 SOLUTION NASAL 2 TIMES DAILY
Qty: 1 EACH | Refills: 11 | Status: SHIPPED | OUTPATIENT
Start: 2024-11-11

## 2024-11-11 NOTE — TELEPHONE ENCOUNTER
----- Message from Breanna sent at 11/8/2024  1:46 PM CST -----  .Type:  Patient Returning Call    Who Called:pt  Who Left Message for Patient:pt   Does the patient know what this is regarding?:flunisolide 25 mcg, 0.025%, (NASALIDE) 25 mcg (0.025 %) Spry  Would the patient rather a call back or a response via [facility]?   Best Call Back Number:[phone]  Additional Information: Please call in this medication for patient [phone] he stats he has called before and nothing has been called

## 2024-11-12 DIAGNOSIS — H53.8 OTHER VISUAL DISTURBANCES: Primary | ICD-10-CM

## 2024-11-13 LAB
LEFT EYE DM RETINOPATHY: NEGATIVE
RIGHT EYE DM RETINOPATHY: NEGATIVE

## 2025-01-07 ENCOUNTER — TELEPHONE (OUTPATIENT)
Dept: INTERNAL MEDICINE | Facility: CLINIC | Age: 60
End: 2025-01-07
Payer: MEDICARE

## 2025-01-29 ENCOUNTER — TELEPHONE (OUTPATIENT)
Dept: INTERNAL MEDICINE | Facility: CLINIC | Age: 60
End: 2025-01-29
Payer: MEDICARE

## 2025-02-03 ENCOUNTER — LAB VISIT (OUTPATIENT)
Dept: LAB | Facility: HOSPITAL | Age: 60
End: 2025-02-03
Attending: INTERNAL MEDICINE
Payer: MEDICARE

## 2025-02-03 DIAGNOSIS — F31.9 BIPOLAR AFFECTIVE DISORDER, REMISSION STATUS UNSPECIFIED: ICD-10-CM

## 2025-02-03 DIAGNOSIS — R73.01 IMPAIRED FASTING GLUCOSE: ICD-10-CM

## 2025-02-03 DIAGNOSIS — Z00.00 WELLNESS EXAMINATION: ICD-10-CM

## 2025-02-03 DIAGNOSIS — E78.49 OTHER HYPERLIPIDEMIA: ICD-10-CM

## 2025-02-03 DIAGNOSIS — Z12.5 PROSTATE CANCER SCREENING: ICD-10-CM

## 2025-02-03 DIAGNOSIS — F20.3 UNDIFFERENTIATED SCHIZOPHRENIA: ICD-10-CM

## 2025-02-03 LAB
ALBUMIN SERPL-MCNC: 4.4 G/DL (ref 3.4–4.8)
ALBUMIN/GLOB SERPL: 1.8 RATIO (ref 1.1–2)
ALP SERPL-CCNC: 44 UNIT/L (ref 40–150)
ALT SERPL-CCNC: 14 UNIT/L (ref 0–55)
ANION GAP SERPL CALC-SCNC: 8 MEQ/L
AST SERPL-CCNC: 22 UNIT/L (ref 5–34)
BACTERIA #/AREA URNS AUTO: ABNORMAL /HPF
BASOPHILS # BLD AUTO: 0.08 X10(3)/MCL
BASOPHILS NFR BLD AUTO: 0.9 %
BILIRUB SERPL-MCNC: 0.6 MG/DL
BILIRUB UR QL STRIP.AUTO: NEGATIVE
BUN SERPL-MCNC: 23.2 MG/DL (ref 8.4–25.7)
CALCIUM SERPL-MCNC: 10.3 MG/DL (ref 8.8–10)
CHLORIDE SERPL-SCNC: 106 MMOL/L (ref 98–107)
CHOLEST SERPL-MCNC: 106 MG/DL
CHOLEST/HDLC SERPL: 4 {RATIO} (ref 0–5)
CLARITY UR: CLEAR
CO2 SERPL-SCNC: 27 MMOL/L (ref 23–31)
COLOR UR AUTO: ABNORMAL
CREAT SERPL-MCNC: 1.56 MG/DL (ref 0.72–1.25)
CREAT UR-MCNC: 66.2 MG/DL (ref 63–166)
CREAT/UREA NIT SERPL: 15
EOSINOPHIL # BLD AUTO: 0.24 X10(3)/MCL (ref 0–0.9)
EOSINOPHIL NFR BLD AUTO: 2.6 %
ERYTHROCYTE [DISTWIDTH] IN BLOOD BY AUTOMATED COUNT: 14.6 % (ref 11.5–17)
EST. AVERAGE GLUCOSE BLD GHB EST-MCNC: 102.5 MG/DL
GFR SERPLBLD CREATININE-BSD FMLA CKD-EPI: 50 ML/MIN/1.73/M2
GLOBULIN SER-MCNC: 2.4 GM/DL (ref 2.4–3.5)
GLUCOSE SERPL-MCNC: 114 MG/DL (ref 82–115)
GLUCOSE UR QL STRIP: NORMAL
HBA1C MFR BLD: 5.2 %
HCT VFR BLD AUTO: 45.7 % (ref 42–52)
HDLC SERPL-MCNC: 30 MG/DL (ref 35–60)
HGB BLD-MCNC: 15.3 G/DL (ref 14–18)
HGB UR QL STRIP: NEGATIVE
IMM GRANULOCYTES # BLD AUTO: 0.02 X10(3)/MCL (ref 0–0.04)
IMM GRANULOCYTES NFR BLD AUTO: 0.2 %
KETONES UR QL STRIP: NEGATIVE
LDLC SERPL CALC-MCNC: 52 MG/DL (ref 50–140)
LEUKOCYTE ESTERASE UR QL STRIP: NEGATIVE
LYMPHOCYTES # BLD AUTO: 2.19 X10(3)/MCL (ref 0.6–4.6)
LYMPHOCYTES NFR BLD AUTO: 23.6 %
MCH RBC QN AUTO: 32.1 PG (ref 27–31)
MCHC RBC AUTO-ENTMCNC: 33.5 G/DL (ref 33–36)
MCV RBC AUTO: 96 FL (ref 80–94)
MICROALBUMIN UR-MCNC: <5 UG/ML
MICROALBUMIN/CREAT RATIO PNL UR: NORMAL
MONOCYTES # BLD AUTO: 0.95 X10(3)/MCL (ref 0.1–1.3)
MONOCYTES NFR BLD AUTO: 10.2 %
MUCOUS THREADS URNS QL MICRO: ABNORMAL /LPF
NEUTROPHILS # BLD AUTO: 5.79 X10(3)/MCL (ref 2.1–9.2)
NEUTROPHILS NFR BLD AUTO: 62.5 %
NITRITE UR QL STRIP: NEGATIVE
NRBC BLD AUTO-RTO: 0 %
PH UR STRIP: 7.5 [PH]
PLATELET # BLD AUTO: 199 X10(3)/MCL (ref 130–400)
PMV BLD AUTO: 11.6 FL (ref 7.4–10.4)
POTASSIUM SERPL-SCNC: 4.5 MMOL/L (ref 3.5–5.1)
PROT SERPL-MCNC: 6.8 GM/DL (ref 5.8–7.6)
PROT UR QL STRIP: NEGATIVE
RBC # BLD AUTO: 4.76 X10(6)/MCL (ref 4.7–6.1)
RBC #/AREA URNS AUTO: ABNORMAL /HPF
SODIUM SERPL-SCNC: 141 MMOL/L (ref 136–145)
SP GR UR STRIP.AUTO: 1.01 (ref 1–1.03)
SQUAMOUS #/AREA URNS LPF: ABNORMAL /HPF
TRIGL SERPL-MCNC: 119 MG/DL (ref 34–140)
TSH SERPL-ACNC: 1.1 UIU/ML (ref 0.35–4.94)
UROBILINOGEN UR STRIP-ACNC: NORMAL
VLDLC SERPL CALC-MCNC: 24 MG/DL
WBC # BLD AUTO: 9.27 X10(3)/MCL (ref 4.5–11.5)
WBC #/AREA URNS AUTO: ABNORMAL /HPF

## 2025-02-03 PROCEDURE — 80053 COMPREHEN METABOLIC PANEL: CPT

## 2025-02-03 PROCEDURE — 80061 LIPID PANEL: CPT

## 2025-02-03 PROCEDURE — 82043 UR ALBUMIN QUANTITATIVE: CPT

## 2025-02-03 PROCEDURE — 36415 COLL VENOUS BLD VENIPUNCTURE: CPT

## 2025-02-03 PROCEDURE — 84443 ASSAY THYROID STIM HORMONE: CPT

## 2025-02-03 PROCEDURE — 85025 COMPLETE CBC W/AUTO DIFF WBC: CPT

## 2025-02-03 PROCEDURE — 81001 URINALYSIS AUTO W/SCOPE: CPT

## 2025-02-03 PROCEDURE — 83036 HEMOGLOBIN GLYCOSYLATED A1C: CPT

## 2025-02-05 ENCOUNTER — OFFICE VISIT (OUTPATIENT)
Dept: INTERNAL MEDICINE | Facility: CLINIC | Age: 60
End: 2025-02-05
Payer: MEDICARE

## 2025-02-05 ENCOUNTER — TELEPHONE (OUTPATIENT)
Dept: INTERNAL MEDICINE | Facility: CLINIC | Age: 60
End: 2025-02-05

## 2025-02-05 VITALS
DIASTOLIC BLOOD PRESSURE: 74 MMHG | BODY MASS INDEX: 28.63 KG/M2 | WEIGHT: 200 LBS | RESPIRATION RATE: 16 BRPM | TEMPERATURE: 98 F | OXYGEN SATURATION: 98 % | HEIGHT: 70 IN | SYSTOLIC BLOOD PRESSURE: 126 MMHG | HEART RATE: 70 BPM

## 2025-02-05 DIAGNOSIS — F20.9 SCHIZOPHRENIA, UNSPECIFIED TYPE: ICD-10-CM

## 2025-02-05 DIAGNOSIS — Z00.00 WELLNESS EXAMINATION: Primary | ICD-10-CM

## 2025-02-05 DIAGNOSIS — E78.49 OTHER HYPERLIPIDEMIA: ICD-10-CM

## 2025-02-05 DIAGNOSIS — G89.29 OTHER CHRONIC BACK PAIN: ICD-10-CM

## 2025-02-05 DIAGNOSIS — Z12.5 PROSTATE CANCER SCREENING: ICD-10-CM

## 2025-02-05 DIAGNOSIS — F31.9 BIPOLAR AFFECTIVE DISORDER, REMISSION STATUS UNSPECIFIED: ICD-10-CM

## 2025-02-05 DIAGNOSIS — F17.210 CIGARETTE NICOTINE DEPENDENCE WITHOUT COMPLICATION: ICD-10-CM

## 2025-02-05 DIAGNOSIS — R73.01 IMPAIRED FASTING GLUCOSE: ICD-10-CM

## 2025-02-05 DIAGNOSIS — M54.89 OTHER CHRONIC BACK PAIN: ICD-10-CM

## 2025-02-05 DIAGNOSIS — N18.31 CHRONIC KIDNEY DISEASE, STAGE 3A: ICD-10-CM

## 2025-02-05 PROCEDURE — 3061F NEG MICROALBUMINURIA REV: CPT | Mod: CPTII,,, | Performed by: INTERNAL MEDICINE

## 2025-02-05 PROCEDURE — 99406 BEHAV CHNG SMOKING 3-10 MIN: CPT | Mod: ,,, | Performed by: INTERNAL MEDICINE

## 2025-02-05 PROCEDURE — 3066F NEPHROPATHY DOC TX: CPT | Mod: CPTII,,, | Performed by: INTERNAL MEDICINE

## 2025-02-05 PROCEDURE — 3044F HG A1C LEVEL LT 7.0%: CPT | Mod: CPTII,,, | Performed by: INTERNAL MEDICINE

## 2025-02-05 PROCEDURE — 1160F RVW MEDS BY RX/DR IN RCRD: CPT | Mod: CPTII,,, | Performed by: INTERNAL MEDICINE

## 2025-02-05 PROCEDURE — 3078F DIAST BP <80 MM HG: CPT | Mod: CPTII,,, | Performed by: INTERNAL MEDICINE

## 2025-02-05 PROCEDURE — G0439 PPPS, SUBSEQ VISIT: HCPCS | Mod: ,,, | Performed by: INTERNAL MEDICINE

## 2025-02-05 PROCEDURE — 1159F MED LIST DOCD IN RCRD: CPT | Mod: CPTII,,, | Performed by: INTERNAL MEDICINE

## 2025-02-05 PROCEDURE — 3074F SYST BP LT 130 MM HG: CPT | Mod: CPTII,,, | Performed by: INTERNAL MEDICINE

## 2025-02-05 RX ORDER — QUETIAPINE FUMARATE 300 MG/1
300 TABLET, FILM COATED ORAL NIGHTLY
COMMUNITY

## 2025-02-05 RX ORDER — TRAMADOL HYDROCHLORIDE 50 MG/1
50 TABLET ORAL 2 TIMES DAILY PRN
Qty: 60 TABLET | Refills: 5 | Status: SHIPPED | OUTPATIENT
Start: 2025-02-05 | End: 2025-02-11 | Stop reason: SDUPTHER

## 2025-02-05 NOTE — LETTER
AUTHORIZATION FOR RELEASE OF   CONFIDENTIAL INFORMATION    Dear SETH,    We are seeing Sharan Cadena, date of birth 1965, in the clinic at [address]. Hamilton Tyler II, MD is the patient's PCP. Sharan Cadena has an outstanding lab/procedure at the time we reviewed his chart. In order to help keep his health information updated, he has authorized us to request the following medical record(s):        (  )  MAMMOGRAM                                      (  )  COLONOSCOPY      (  )  PAP SMEAR                                          (  )  OUTSIDE LAB RESULTS     (  )  DEXA SCAN                                          ( X ) DIABETIC EYE EXAM            (  )  FOOT EXAM                                          (  )  ENTIRE RECORD     (  )  OUTSIDE IMMUNIZATIONS                 (  )  _______________         Please fax records to [facility], Voitier, Thomas L II, MD, [phone]     If you have any questions, please contact [phone]  Patient Name: Sharan Cadena  : 1965  Patient Phone #: [phone]

## 2025-02-05 NOTE — PROGRESS NOTES
"A for select Subjective:       Patient ID: Sharan Cadena is a 59 y.o. male.      Patient Care Team:  Hamilton Tyler II, MD as PCP - General (Internal Medicine)    Chief Complaint: Medicare AWV Follow Up, Hyperlipidemia, Chronic Kidney Disease, Sleep Apnea, and Manic Behavior    59-year-old male seen today for followup of schizophrenia, bipolar disorder, chronic neck and back pain, anxiety, sleep apnea, tobacco use, chronic rhinosinusitis, and peptic ulcer disease.        Review of Systems   Constitutional:  Negative for fever.   HENT:  Negative for nosebleeds.    Eyes:  Negative for visual disturbance.   Respiratory:  Negative for shortness of breath.    Cardiovascular:  Negative for chest pain.   Gastrointestinal:  Negative for abdominal pain.   Genitourinary:  Negative for dysuria.   Musculoskeletal:  Negative for gait problem.   Neurological:  Negative for headaches.           Patient Reported Health Risk Assessment         Objective:      Physical Exam  HENT:      Head: Normocephalic.      Mouth/Throat:      Pharynx: Oropharynx is clear.   Eyes:      Extraocular Movements: Extraocular movements intact.   Cardiovascular:      Rate and Rhythm: Normal rate and regular rhythm.   Pulmonary:      Breath sounds: Normal breath sounds.   Abdominal:      Palpations: Abdomen is soft.   Musculoskeletal:         General: No swelling.   Skin:     General: Skin is warm.   Neurological:      General: No focal deficit present.      Mental Status: He is alert and oriented to person, place, and time.   Psychiatric:         Mood and Affect: Mood normal.         Vitals:    02/05/25 0851   BP: 126/74   Pulse: 70   Resp: 16   Temp: 97.9 °F (36.6 °C)   SpO2: 98%   Weight: 90.7 kg (200 lb)   Height: 5' 10" (1.778 m)                       No data to display                  2/5/2025     9:30 AM 7/12/2024     9:15 AM 1/10/2024     9:45 AM 11/14/2023     2:15 PM 7/12/2023    10:15 AM 5/15/2023    11:00 AM 1/6/2023     9:45 AM   Fall " Risk Assessment - Outpatient   Mobility Status Ambulatory Ambulatory Ambulatory Ambulatory Ambulatory Ambulatory Ambulatory   Number of falls 0 0 0 0 0 0 0   Identified as fall risk False False False False False False False                  Assessment:       Problem List Items Addressed This Visit       Bipolar disorder    Schizophrenia    Other hyperlipidemia    RESOLVED: Wellness examination - Primary    Prostate cancer screening    Impaired fasting glucose    Chronic kidney disease, stage 3a    Cigarette nicotine dependence without complication         Medication List with Changes/Refills   Current Medications    ALPRAZOLAM (XANAX) 2 MG TAB    Take 2 mg by mouth 2 (two) times daily. as needed for anxiety.    ATORVASTATIN (LIPITOR) 40 MG TABLET    Take 40 mg by mouth every evening.    BENZTROPINE (COGENTIN) 2 MG TAB    Take 2 mg by mouth 3 (three) times daily.    BLOOD SUGAR DIAGNOSTIC STRP    To check BG 1 times daily, to use with insurance preferred meter    BLOOD-GLUCOSE METER KIT    To check BG 1 times daily, to use with insurance preferred meter    FAMOTIDINE (PEPCID) 40 MG TABLET    Take 1 tablet (40 mg total) by mouth 2 (two) times daily.    FENOFIBRATE (TRICOR) 145 MG TABLET    Take 145 mg by mouth once daily.    FEXOFENADINE (ALLEGRA) 180 MG TABLET    Take 180 mg by mouth once daily.    FLUNISOLIDE 25 MCG, 0.025%, (NASALIDE) 25 MCG (0.025 %) SPRY    2 sprays by Nasal route 2 (two) times daily.    LANCETS MISC    To check BG 1 times daily, to use with insurance preferred meter    LINACLOTIDE (LINZESS) 290 MCG CAP CAPSULE    Take 1 capsule (290 mcg total) by mouth before breakfast.    METOPROLOL SUCCINATE (TOPROL-XL) 25 MG 24 HR TABLET    Take 25 mg by mouth once daily.    OLANZAPINE (ZYPREXA) 15 MG TAB    Take 1 tablet by mouth Daily.    PANTOPRAZOLE (PROTONIX) 40 MG TABLET    Take 1 tablet (40 mg total) by mouth once daily.    QUETIAPINE (SEROQUEL XR) 300 MG TB24    Take 300 mg by mouth every evening.     QUETIAPINE (SEROQUEL) 300 MG TAB    Take 300 mg by mouth every evening.   Discontinued Medications    OXCARBAZEPINE (TRILEPTAL) 150 MG TAB    Take 150 mg by mouth 2 (two) times a day.        Plan:       1. Schizophrenia: He is seen by Dr. Thorne and his nurse practitioner regularly.      2. Chronic neck and back pain: Stable. He underwent sacroiliac joint fusion with Dr. Aldo Conway in 2019 with good results. Continue Tylenol and add Tramadol     3. Sleep apnea: Continue CPAP.      4. Tobacco use: He was smoking 1 pack per day and is down to a 1/2 PPD, trying to cut back; tried Chantix in 2023.  Smoking cessation counseling today (3 minutes)     5. Peptic ulcer disease:  Continue pantoprazole, chest pain resolved.  Working very well     6. Chronic rhinosinusitis: He also has asthma. Because of ongoing cough, referred back to ENT and was given medication for allergies; Flonase was started     7. Hyperlipidemia: He is followed by cardiology. Continue atorvastatin     8. Impaired fasting glucose: A1c 5.2.  He stopped metformin in 2022 because of weight loss.  Stopped Ozempic in 2023 because it was not helping with weight loss.  Diabetes has resolved    9. Edema: Lasix as needed     10. Asthma: Stable. Pneumovax 2019     11.  Chronic constipation: He has tried over-the-counter laxatives. SBO in 2021. Linzess working well     12. Recurrent nephrolithiasis: Last episode 5/2023, Dr. Silas Ramírez following (not lately)    13. CKD 3a:  Monitor creatinine, avoid NSAIDs    14. Wellness: Colonoscopy in 2022 with polyps, every 5 years. 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 Only plays at home now                 Medicare Annual Wellness and Personalized Prevention Plan:   Fall Risk + Home Safety + Hearing Impairment + Depression Screen + Cognitive Impairment Screen + Health Risk Assessment all reviewed    Health Maintenance Topics with due status: Not Due        Topic Last Completion Date    TETANUS VACCINE 04/26/2019    Colorectal Cancer Screening 06/22/2022    Low Dose Statin 07/12/2024    Diabetes Urine Screening 02/03/2025    Lipid Panel 02/03/2025    Hemoglobin A1c 02/03/2025    RSV Vaccine (Age 60+ and Pregnant patients) Not Due      The patient's Health Maintenance was reviewed and the following appears to be due at this time:   Health Maintenance Due   Topic Date Due    Hepatitis C Screening  Never done    Foot Exam  Never done    HIV Screening  Never done    Shingles Vaccine (3 of 3) 09/14/2019    Diabetic Eye Exam  02/02/2024       Advance Care Planning   I attest to discussing Advance Care Planning with patient and/or family member.  Education was provided including the importance of the Health Care Power of , Advance Directives, and/or [place] documentation.  The patient expressed understanding to the importance of this information and discussion.  Length of ACP conversation in minutes: 1    Advance Care Planning     Date: 02/05/2025    Living Will  During this visit, I engaged the patient  in the voluntary advance care planning process.  The patient and I reviewed the role for advance directives and their purpose in directing future healthcare if the patient's unable to speak for him/herself.  At this point in time, the patient does have full decision-making capacity.  We discussed different extreme health states that he could experience, and reviewed what kind of medical care he would want in those situations.  The patient communicated that if he were comatose and had little chance of a meaningful recovery, he would want machines/life-sustaining treatments used. In addition to the above preference, other important end-of-life issues for the patient include  x . The patient has completed a living will to reflect these preferences.  I spent a total of 1 minutes engaging the patient in this advance care planning discussion.                 Opioid  Screening: Patient medication list reviewed, patient is not taking prescription opioids. Patient is not using additional opioids than prescribed. Patient is at low risk of substance abuse based on this opioid use history.     No follow-ups on file. In addition to their scheduled follow up, the patient has also been instructed to follow up on as needed basis.

## 2025-02-05 NOTE — TELEPHONE ENCOUNTER
----- Message from Sharifa sent at 2/5/2025  9:49 AM CST -----  Who Called: Sharan Cadena    Patient is returning phone call    Who Left Message for Patient:  Does the patient know what this is regarding?:      Preferred Method of Contact: Phone Call  Patient's Preferred Phone Number on File: [phone]   Best Call Back Number, if different:  Additional Information: pt called to speak with jose stated he need to update her with information pls advise

## 2025-02-11 DIAGNOSIS — G89.29 OTHER CHRONIC BACK PAIN: ICD-10-CM

## 2025-02-11 DIAGNOSIS — M54.89 OTHER CHRONIC BACK PAIN: ICD-10-CM

## 2025-02-11 RX ORDER — TRAMADOL HYDROCHLORIDE 50 MG/1
50 TABLET ORAL 2 TIMES DAILY PRN
Qty: 60 TABLET | Refills: 5 | Status: SHIPPED | OUTPATIENT
Start: 2025-02-11

## 2025-02-11 NOTE — TELEPHONE ENCOUNTER
----- Message from Nurse Holguin sent at 2/5/2025 10:28 AM CST -----  Send new tramadol script to [facility]

## 2025-02-27 DIAGNOSIS — Z00.00 WELLNESS EXAMINATION: ICD-10-CM

## 2025-02-27 RX ORDER — FAMOTIDINE 40 MG/1
40 TABLET, FILM COATED ORAL 2 TIMES DAILY
Qty: 60 TABLET | Refills: 11 | Status: SHIPPED | OUTPATIENT
Start: 2025-02-27 | End: 2026-02-27

## 2025-03-25 ENCOUNTER — PATIENT OUTREACH (OUTPATIENT)
Facility: CLINIC | Age: 60
End: 2025-03-25
Payer: MEDICARE

## 2025-03-25 NOTE — PROGRESS NOTES
Health Maintenance Topic(s) Outreach Outcomes & Actions Taken:    Eye Exam - Outreach Outcomes & Actions Taken  : Diabetic Eye External Records Uploaded, Care Team & History Updated if Applicable     Additional Notes:  DM Eye Exam 11/13/24

## 2025-07-19 ENCOUNTER — NURSE TRIAGE (OUTPATIENT)
Dept: ADMINISTRATIVE | Facility: CLINIC | Age: 60
End: 2025-07-19
Payer: MEDICARE

## 2025-07-19 NOTE — TELEPHONE ENCOUNTER
Pt calling to find out the date and time of his upcoming PCP visit. Pt denies symptoms for triage today. I was able to provide the requested information and pt has no further questions at this time.           Reason for Disposition   Question about upcoming scheduled surgery, procedure or test, no triage required, and triager able to answer question    Protocols used: Information Only Call - No Triage-A-

## 2025-07-30 ENCOUNTER — TELEPHONE (OUTPATIENT)
Dept: INTERNAL MEDICINE | Facility: CLINIC | Age: 60
End: 2025-07-30
Payer: MEDICARE

## 2025-07-31 ENCOUNTER — LAB VISIT (OUTPATIENT)
Dept: LAB | Facility: HOSPITAL | Age: 60
End: 2025-07-31
Attending: INTERNAL MEDICINE
Payer: MEDICARE

## 2025-07-31 DIAGNOSIS — M54.89 OTHER CHRONIC BACK PAIN: ICD-10-CM

## 2025-07-31 DIAGNOSIS — F31.9 BIPOLAR AFFECTIVE DISORDER, REMISSION STATUS UNSPECIFIED: ICD-10-CM

## 2025-07-31 DIAGNOSIS — N18.31 CHRONIC KIDNEY DISEASE, STAGE 3A: ICD-10-CM

## 2025-07-31 DIAGNOSIS — R73.01 IMPAIRED FASTING GLUCOSE: ICD-10-CM

## 2025-07-31 DIAGNOSIS — Z12.5 PROSTATE CANCER SCREENING: ICD-10-CM

## 2025-07-31 DIAGNOSIS — G89.29 OTHER CHRONIC BACK PAIN: ICD-10-CM

## 2025-07-31 DIAGNOSIS — F17.210 CIGARETTE NICOTINE DEPENDENCE WITHOUT COMPLICATION: ICD-10-CM

## 2025-07-31 DIAGNOSIS — E78.49 OTHER HYPERLIPIDEMIA: ICD-10-CM

## 2025-07-31 DIAGNOSIS — F20.9 SCHIZOPHRENIA, UNSPECIFIED TYPE: ICD-10-CM

## 2025-07-31 DIAGNOSIS — Z00.00 WELLNESS EXAMINATION: ICD-10-CM

## 2025-07-31 LAB
ALBUMIN SERPL-MCNC: 4.4 G/DL (ref 3.4–4.8)
ALBUMIN/GLOB SERPL: 1.5 RATIO (ref 1.1–2)
ALP SERPL-CCNC: 46 UNIT/L (ref 40–150)
ALT SERPL-CCNC: 11 UNIT/L (ref 0–55)
AMORPH URATE CRY URNS QL MICRO: ABNORMAL /UL
ANION GAP SERPL CALC-SCNC: 7 MEQ/L
AST SERPL-CCNC: 16 UNIT/L (ref 11–45)
BACTERIA #/AREA URNS AUTO: ABNORMAL /HPF
BASOPHILS # BLD AUTO: 0.05 X10(3)/MCL
BASOPHILS NFR BLD AUTO: 0.6 %
BILIRUB SERPL-MCNC: 0.8 MG/DL
BILIRUB UR QL STRIP.AUTO: NEGATIVE
BUN SERPL-MCNC: 18.9 MG/DL (ref 8.4–25.7)
CALCIUM SERPL-MCNC: 10.3 MG/DL (ref 8.8–10)
CHLORIDE SERPL-SCNC: 104 MMOL/L (ref 98–107)
CHOLEST SERPL-MCNC: 124 MG/DL
CHOLEST/HDLC SERPL: 3 {RATIO} (ref 0–5)
CLARITY UR: CLEAR
CO2 SERPL-SCNC: 29 MMOL/L (ref 23–31)
COLOR UR AUTO: YELLOW
CREAT SERPL-MCNC: 1.1 MG/DL (ref 0.72–1.25)
CREAT/UREA NIT SERPL: 17
EOSINOPHIL # BLD AUTO: 0.06 X10(3)/MCL (ref 0–0.9)
EOSINOPHIL NFR BLD AUTO: 0.8 %
ERYTHROCYTE [DISTWIDTH] IN BLOOD BY AUTOMATED COUNT: 15.3 % (ref 11.5–17)
EST. AVERAGE GLUCOSE BLD GHB EST-MCNC: 99.7 MG/DL
GFR SERPLBLD CREATININE-BSD FMLA CKD-EPI: >60 ML/MIN/1.73/M2
GLOBULIN SER-MCNC: 2.9 GM/DL (ref 2.4–3.5)
GLUCOSE SERPL-MCNC: 89 MG/DL (ref 82–115)
GLUCOSE UR QL STRIP: NORMAL
HBA1C MFR BLD: 5.1 %
HCT VFR BLD AUTO: 49.4 % (ref 42–52)
HDLC SERPL-MCNC: 45 MG/DL (ref 35–60)
HGB BLD-MCNC: 16.3 G/DL (ref 14–18)
HGB UR QL STRIP: NEGATIVE
IMM GRANULOCYTES # BLD AUTO: 0.01 X10(3)/MCL (ref 0–0.04)
IMM GRANULOCYTES NFR BLD AUTO: 0.1 %
KETONES UR QL STRIP: ABNORMAL
LDLC SERPL CALC-MCNC: 63 MG/DL (ref 50–140)
LEUKOCYTE ESTERASE UR QL STRIP: NEGATIVE
LYMPHOCYTES # BLD AUTO: 1.49 X10(3)/MCL (ref 0.6–4.6)
LYMPHOCYTES NFR BLD AUTO: 18.9 %
MCH RBC QN AUTO: 31.8 PG (ref 27–31)
MCHC RBC AUTO-ENTMCNC: 33 G/DL (ref 33–36)
MCV RBC AUTO: 96.5 FL (ref 80–94)
MONOCYTES # BLD AUTO: 0.64 X10(3)/MCL (ref 0.1–1.3)
MONOCYTES NFR BLD AUTO: 8.1 %
MUCOUS THREADS URNS QL MICRO: ABNORMAL /LPF
NEUTROPHILS # BLD AUTO: 5.62 X10(3)/MCL (ref 2.1–9.2)
NEUTROPHILS NFR BLD AUTO: 71.5 %
NITRITE UR QL STRIP: NEGATIVE
NRBC BLD AUTO-RTO: 0 %
PH UR STRIP: 6.5 [PH]
PLATELET # BLD AUTO: 196 X10(3)/MCL (ref 130–400)
PMV BLD AUTO: 11.3 FL (ref 7.4–10.4)
POTASSIUM SERPL-SCNC: 4.5 MMOL/L (ref 3.5–5.1)
PROT SERPL-MCNC: 7.3 GM/DL (ref 5.8–7.6)
PROT UR QL STRIP: NEGATIVE
PSA SERPL-MCNC: 1.36 NG/ML
RBC # BLD AUTO: 5.12 X10(6)/MCL (ref 4.7–6.1)
RBC #/AREA URNS AUTO: ABNORMAL /HPF
SODIUM SERPL-SCNC: 140 MMOL/L (ref 136–145)
SP GR UR STRIP.AUTO: 1.02 (ref 1–1.03)
SQUAMOUS #/AREA URNS LPF: ABNORMAL /HPF
TRIGL SERPL-MCNC: 82 MG/DL (ref 34–140)
TSH SERPL-ACNC: 1.37 UIU/ML (ref 0.35–4.94)
UROBILINOGEN UR STRIP-ACNC: 2
VLDLC SERPL CALC-MCNC: 16 MG/DL
WBC # BLD AUTO: 7.87 X10(3)/MCL (ref 4.5–11.5)
WBC #/AREA URNS AUTO: ABNORMAL /HPF

## 2025-07-31 PROCEDURE — 83036 HEMOGLOBIN GLYCOSYLATED A1C: CPT

## 2025-07-31 PROCEDURE — 80061 LIPID PANEL: CPT

## 2025-07-31 PROCEDURE — 81001 URINALYSIS AUTO W/SCOPE: CPT

## 2025-07-31 PROCEDURE — 84153 ASSAY OF PSA TOTAL: CPT

## 2025-07-31 PROCEDURE — 85025 COMPLETE CBC W/AUTO DIFF WBC: CPT

## 2025-07-31 PROCEDURE — 80053 COMPREHEN METABOLIC PANEL: CPT

## 2025-07-31 PROCEDURE — 84443 ASSAY THYROID STIM HORMONE: CPT

## 2025-07-31 PROCEDURE — 36415 COLL VENOUS BLD VENIPUNCTURE: CPT

## 2025-08-06 ENCOUNTER — OFFICE VISIT (OUTPATIENT)
Dept: INTERNAL MEDICINE | Facility: CLINIC | Age: 60
End: 2025-08-06
Payer: MEDICARE

## 2025-08-06 VITALS
HEART RATE: 68 BPM | DIASTOLIC BLOOD PRESSURE: 66 MMHG | RESPIRATION RATE: 16 BRPM | OXYGEN SATURATION: 99 % | SYSTOLIC BLOOD PRESSURE: 102 MMHG | HEIGHT: 70 IN | TEMPERATURE: 98 F | BODY MASS INDEX: 23.62 KG/M2 | WEIGHT: 165 LBS

## 2025-08-06 DIAGNOSIS — F17.210 CIGARETTE NICOTINE DEPENDENCE WITHOUT COMPLICATION: ICD-10-CM

## 2025-08-06 DIAGNOSIS — R73.01 IMPAIRED FASTING GLUCOSE: ICD-10-CM

## 2025-08-06 DIAGNOSIS — E78.49 OTHER HYPERLIPIDEMIA: ICD-10-CM

## 2025-08-06 DIAGNOSIS — N18.31 CHRONIC KIDNEY DISEASE, STAGE 3A: ICD-10-CM

## 2025-08-06 DIAGNOSIS — Z00.00 WELLNESS EXAMINATION: ICD-10-CM

## 2025-08-06 DIAGNOSIS — F20.9 SCHIZOPHRENIA, UNSPECIFIED TYPE: ICD-10-CM

## 2025-08-06 DIAGNOSIS — Z12.5 PROSTATE CANCER SCREENING: ICD-10-CM

## 2025-08-06 DIAGNOSIS — F31.9 BIPOLAR AFFECTIVE DISORDER, REMISSION STATUS UNSPECIFIED: Primary | ICD-10-CM

## 2025-08-06 PROCEDURE — 3078F DIAST BP <80 MM HG: CPT | Mod: CPTII,,, | Performed by: INTERNAL MEDICINE

## 2025-08-06 PROCEDURE — 1159F MED LIST DOCD IN RCRD: CPT | Mod: CPTII,,, | Performed by: INTERNAL MEDICINE

## 2025-08-06 PROCEDURE — 1160F RVW MEDS BY RX/DR IN RCRD: CPT | Mod: CPTII,,, | Performed by: INTERNAL MEDICINE

## 2025-08-06 PROCEDURE — 3044F HG A1C LEVEL LT 7.0%: CPT | Mod: CPTII,,, | Performed by: INTERNAL MEDICINE

## 2025-08-06 PROCEDURE — 3074F SYST BP LT 130 MM HG: CPT | Mod: CPTII,,, | Performed by: INTERNAL MEDICINE

## 2025-08-06 PROCEDURE — 3008F BODY MASS INDEX DOCD: CPT | Mod: CPTII,,, | Performed by: INTERNAL MEDICINE

## 2025-08-06 PROCEDURE — 99406 BEHAV CHNG SMOKING 3-10 MIN: CPT | Mod: ,,, | Performed by: INTERNAL MEDICINE

## 2025-08-06 PROCEDURE — 99214 OFFICE O/P EST MOD 30 MIN: CPT | Mod: 25,,, | Performed by: INTERNAL MEDICINE

## 2025-08-06 RX ORDER — QUETIAPINE FUMARATE 300 MG/1
300 TABLET, FILM COATED ORAL NIGHTLY
COMMUNITY

## 2025-08-06 NOTE — PROGRESS NOTES
"A for select Subjective:       Patient ID: Sharan Cadena is a 59 y.o. male.      Patient Care Team:  Hamilton Tyler II, MD as PCP - General (Internal Medicine)    Chief Complaint: Impaired Fasting Glucose, Chronic Kidney Disease, Hyperlipidemia, and Sleep Apnea    59-year-old male seen today for followup of schizophrenia, bipolar disorder, chronic neck and back pain, anxiety, sleep apnea, tobacco use, chronic rhinosinusitis, and peptic ulcer disease.        Review of Systems   Constitutional:  Negative for fever.   HENT:  Negative for nosebleeds.    Eyes:  Negative for visual disturbance.   Respiratory:  Negative for shortness of breath.    Cardiovascular:  Negative for chest pain.   Gastrointestinal:  Negative for abdominal pain.   Genitourinary:  Negative for dysuria.   Musculoskeletal:  Negative for gait problem.   Neurological:  Negative for headaches.           Patient Reported Health Risk Assessment         Objective:      Physical Exam  HENT:      Head: Normocephalic.      Mouth/Throat:      Pharynx: Oropharynx is clear.   Eyes:      Extraocular Movements: Extraocular movements intact.   Cardiovascular:      Rate and Rhythm: Normal rate and regular rhythm.   Pulmonary:      Breath sounds: Normal breath sounds.   Abdominal:      Palpations: Abdomen is soft.   Musculoskeletal:         General: No swelling.   Skin:     General: Skin is warm.   Neurological:      General: No focal deficit present.      Mental Status: He is alert and oriented to person, place, and time.   Psychiatric:         Mood and Affect: Mood normal.         Vitals:    08/06/25 0937   BP: 102/66   Pulse: 68   Resp: 16   Temp: 97.9 °F (36.6 °C)   SpO2: 99%   Weight: 74.8 kg (165 lb)   Height: 5' 10" (1.778 m)                         No data to display                  8/6/2025     9:45 AM 2/5/2025     9:30 AM 7/12/2024     9:15 AM 1/10/2024     9:45 AM 11/14/2023     2:15 PM 7/12/2023    10:15 AM 5/15/2023    11:00 AM   Fall Risk " Assessment - Outpatient   Mobility Status Ambulatory Ambulatory Ambulatory Ambulatory Ambulatory Ambulatory Ambulatory   Number of falls 0 0 0 0 0 0 0   Identified as fall risk False False False False False False False                  Assessment:       Problem List Items Addressed This Visit       Bipolar disorder - Primary    Relevant Orders    CBC Auto Differential    Comprehensive Metabolic Panel    Lipid Panel    Urinalysis, Reflex to Urine Culture Urine, Clean Catch    TSH    Hemoglobin A1C    Schizophrenia    Relevant Orders    CBC Auto Differential    Comprehensive Metabolic Panel    Lipid Panel    Urinalysis, Reflex to Urine Culture Urine, Clean Catch    TSH    Hemoglobin A1C    Other hyperlipidemia    Relevant Orders    CBC Auto Differential    Comprehensive Metabolic Panel    Lipid Panel    Urinalysis, Reflex to Urine Culture Urine, Clean Catch    TSH    Hemoglobin A1C    RESOLVED: Wellness examination    Relevant Orders    CBC Auto Differential    Comprehensive Metabolic Panel    Lipid Panel    Urinalysis, Reflex to Urine Culture Urine, Clean Catch    TSH    Hemoglobin A1C    Prostate cancer screening    Relevant Orders    CBC Auto Differential    Comprehensive Metabolic Panel    Lipid Panel    Urinalysis, Reflex to Urine Culture Urine, Clean Catch    TSH    Hemoglobin A1C    Impaired fasting glucose    Relevant Orders    CBC Auto Differential    Comprehensive Metabolic Panel    Lipid Panel    Urinalysis, Reflex to Urine Culture Urine, Clean Catch    TSH    Hemoglobin A1C    Chronic kidney disease, stage 3a    Relevant Orders    CBC Auto Differential    Comprehensive Metabolic Panel    Lipid Panel    Urinalysis, Reflex to Urine Culture Urine, Clean Catch    TSH    Hemoglobin A1C    Cigarette nicotine dependence without complication    Relevant Orders    CBC Auto Differential    Comprehensive Metabolic Panel    Lipid Panel    Urinalysis, Reflex to Urine Culture Urine, Clean Catch    TSH    Hemoglobin A1C          Medication List with Changes/Refills   Current Medications    ALPRAZOLAM (XANAX) 2 MG TAB    Take 2 mg by mouth 2 (two) times daily. as needed for anxiety.    ATORVASTATIN (LIPITOR) 40 MG TABLET    Take 40 mg by mouth every evening.    BENZTROPINE (COGENTIN) 2 MG TAB    Take 2 mg by mouth 3 (three) times daily.    BLOOD SUGAR DIAGNOSTIC STRP    To check BG 1 times daily, to use with insurance preferred meter    BLOOD-GLUCOSE METER KIT    To check BG 1 times daily, to use with insurance preferred meter    FAMOTIDINE (PEPCID) 40 MG TABLET    Take 1 tablet (40 mg total) by mouth 2 (two) times daily.    FENOFIBRATE (TRICOR) 145 MG TABLET    Take 145 mg by mouth once daily.    FEXOFENADINE (ALLEGRA) 180 MG TABLET    Take 180 mg by mouth once daily.    FLUNISOLIDE 25 MCG, 0.025%, (NASALIDE) 25 MCG (0.025 %) SPRY    2 sprays by Nasal route 2 (two) times daily.    LANCETS MISC    To check BG 1 times daily, to use with insurance preferred meter    OLANZAPINE (ZYPREXA) 15 MG TAB    Take 1 tablet by mouth Daily.    PANTOPRAZOLE (PROTONIX) 40 MG TABLET    Take 1 tablet (40 mg total) by mouth once daily.    QUETIAPINE (SEROQUEL XR) 300 MG TB24    Take 300 mg by mouth every evening.    QUETIAPINE (SEROQUEL) 300 MG TAB    Take 300 mg by mouth every evening.    TRAMADOL (ULTRAM) 50 MG TABLET    Take 1 tablet (50 mg total) by mouth 2 (two) times daily as needed for Pain.   Discontinued Medications    METOPROLOL SUCCINATE (TOPROL-XL) 25 MG 24 HR TABLET    Take 25 mg by mouth once daily.    QUETIAPINE (SEROQUEL) 300 MG TAB    Take 300 mg by mouth every evening.        Plan:       1. Schizophrenia: He is seen by Dr. Thorne and his nurse practitioner regularly. Seroquel and Zyprexa     2. Chronic neck and back pain: Stable. He underwent sacroiliac joint fusion with Dr. Aldo Conway in 2019 with good results. Continue Tylenol and add Tramadol     3. Sleep apnea: Continue CPAP.      4. Tobacco use: He was smoking 1 pack per day and is  down to a 1/2 PPD, trying to cut back; tried Chantix in 2023.  Smoking cessation counseling today (3 minutes). Schedule CT Lung cancer screening     5. Peptic ulcer disease:  Continue pantoprazole, chest pain resolved.  Working very well     6. Chronic rhinosinusitis: He also has asthma. Because of ongoing cough, referred back to ENT and was given medication for allergies; Flonase was started     7. Hyperlipidemia: He is followed by cardiology. Continue atorvastatin     8. Impaired fasting glucose: A1c 5.1.  He stopped metformin in 2022 because of weight loss.  Stopped Ozempic in 2023 because it was not helping with weight loss.  Diabetes has resolved    9. Edema: Lasix as needed     10. Asthma: Stable. Pneumovax 2019     11.  Chronic constipation: He has tried over-the-counter laxatives. SBO in 2021. Linzess working well     12. Recurrent nephrolithiasis: Last episode 5/2023, Dr. Silas Ramírez following (not lately)    13. CKD 3a:  Monitor creatinine, avoid NSAIDs    14. Wellness: Colonoscopy in 2022 with polyps, every 5 years. 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 Only plays at home now               Medicare Annual Wellness and Personalized Prevention Plan:   Fall Risk + Home Safety + Hearing Impairment + Depression Screen + Cognitive Impairment Screen + Health Risk Assessment all reviewed    Health Maintenance Topics with due status: Not Due       Topic Last Completion Date    TETANUS VACCINE 04/26/2019    Colorectal Cancer Screening 06/22/2022    Influenza Vaccine 09/15/2024    Annual UACr 02/03/2025    Hemoglobin A1c (Prediabetes) 07/31/2025    Lipid Panel 07/31/2025    RSV Vaccine (Age 60+ and Pregnant patients) Not Due      The patient's Health Maintenance was reviewed and the following appears to be due at this time:   Health Maintenance Due   Topic Date Due    Hepatitis C Screening  Never done     HIV Screening  Never done    Shingles Vaccine (3 of 3) 09/14/2019       Advance Care Planning   I attest to discussing Advance Care Planning with patient and/or family member.  Education was provided including the importance of the Health Care Power of , Advance Directives, and/or [place] documentation.  The patient expressed understanding to the importance of this information and discussion.  Length of ACP conversation in minutes: 0    Advance Care Planning     Date: 02/05/2025    Living Will  During this visit, I engaged the patient  in the voluntary advance care planning process.  The patient and I reviewed the role for advance directives and their purpose in directing future healthcare if the patient's unable to speak for him/herself.  At this point in time, the patient does have full decision-making capacity.  We discussed different extreme health states that he could experience, and reviewed what kind of medical care he would want in those situations.  The patient communicated that if he were comatose and had little chance of a meaningful recovery, he would want machines/life-sustaining treatments used. In addition to the above preference, other important end-of-life issues for the patient include x. The patient has completed a living will to reflect these preferences.  I spent a total of 1 minutes engaging the patient in this advance care planning discussion.                 Opioid Screening: Patient medication list reviewed, patient is not taking prescription opioids. Patient is not using additional opioids than prescribed. Patient is at low risk of substance abuse based on this opioid use history.     No follow-ups on file. In addition to their scheduled follow up, the patient has also been instructed to follow up on as needed basis.

## 2025-08-08 ENCOUNTER — TELEPHONE (OUTPATIENT)
Dept: INTERNAL MEDICINE | Facility: CLINIC | Age: 60
End: 2025-08-08
Payer: MEDICARE

## 2025-08-08 DIAGNOSIS — Z87.891 PERSONAL HISTORY OF NICOTINE DEPENDENCE: ICD-10-CM

## 2025-08-08 DIAGNOSIS — F17.200 CURRENT EVERY DAY SMOKER: Primary | ICD-10-CM

## 2025-08-08 NOTE — TELEPHONE ENCOUNTER
Copied from CRM #[id number]. Topic: Appointments - Amb Referral  >> Aug 8, 2025  8:01 KAITLIN Saleh wrote:  .Who Called: Sharan Cadena    What order is the patient requesting: CT Lungs     When does the expect the orders to be performed? Soon    Preferred Method of Contact: Phone Call    Patient's Preferred Phone Number on File: [phone]     Best Call Back Number, if different:    Additional Information: Pt called stating he was told an order for him to have a CT on his lungs was supposed to be sent to [facility]. Pt stated he called [facility] to f/u on order and was informed they do not have one. Pt requesting a call once order has been placed. Please advise, thank you.

## (undated) DEVICE — SET ANGIO ACIST CVI ANGIOTOUCH

## (undated) DEVICE — CATH IMPULSE FR4 5FR 125CM

## (undated) DEVICE — Device

## (undated) DEVICE — TUBING HP AIRLSS ROT ADPT 30IN

## (undated) DEVICE — WIRE GUIDE INQWIRE STR 180CM

## (undated) DEVICE — CATH JACKY RADIAL 5FR 100CM

## (undated) DEVICE — CATH IMPULSE FL4 5FR 100CM

## (undated) DEVICE — KIT GLIDESHEATH SLEND 6FR 10CM

## (undated) DEVICE — PAD DEFIB CADENCE ADULT R2

## (undated) DEVICE — CATH IMPULSE PIGTAIL 5FR 125CM

## (undated) DEVICE — KIT HAND CONTROL HIGH PRESSUR

## (undated) DEVICE — CATH FL 3.5 5FR

## (undated) DEVICE — CANNULA NASAL ADULT

## (undated) DEVICE — BAND TR WITH INFLATOR